# Patient Record
Sex: MALE | Race: WHITE | Employment: OTHER | URBAN - METROPOLITAN AREA
[De-identification: names, ages, dates, MRNs, and addresses within clinical notes are randomized per-mention and may not be internally consistent; named-entity substitution may affect disease eponyms.]

---

## 2017-04-13 ENCOUNTER — RECORDS - HEALTHEAST (OUTPATIENT)
Dept: LAB | Facility: CLINIC | Age: 67
End: 2017-04-13

## 2017-04-13 LAB
CHOLEST SERPL-MCNC: 165 MG/DL
FASTING STATUS PATIENT QL REPORTED: NO
HDLC SERPL-MCNC: 49 MG/DL
LDLC SERPL CALC-MCNC: 99 MG/DL
PSA SERPL-MCNC: 0.2 NG/ML (ref 0–4.5)
TRIGL SERPL-MCNC: 86 MG/DL

## 2018-06-19 ENCOUNTER — RECORDS - HEALTHEAST (OUTPATIENT)
Dept: LAB | Facility: CLINIC | Age: 68
End: 2018-06-19

## 2018-06-19 LAB
ALBUMIN SERPL-MCNC: 4.1 G/DL (ref 3.5–5)
ALP SERPL-CCNC: 105 U/L (ref 45–120)
ALT SERPL W P-5'-P-CCNC: 22 U/L (ref 0–45)
ANION GAP SERPL CALCULATED.3IONS-SCNC: 14 MMOL/L (ref 5–18)
AST SERPL W P-5'-P-CCNC: 17 U/L (ref 0–40)
BASOPHILS # BLD AUTO: 0.1 THOU/UL (ref 0–0.2)
BASOPHILS NFR BLD AUTO: 1 % (ref 0–2)
BILIRUB SERPL-MCNC: 1 MG/DL (ref 0–1)
BUN SERPL-MCNC: 14 MG/DL (ref 8–22)
CALCIUM SERPL-MCNC: 10.1 MG/DL (ref 8.5–10.5)
CHLORIDE BLD-SCNC: 104 MMOL/L (ref 98–107)
CHOLEST SERPL-MCNC: 198 MG/DL
CO2 SERPL-SCNC: 22 MMOL/L (ref 22–31)
CREAT SERPL-MCNC: 0.82 MG/DL (ref 0.7–1.3)
EOSINOPHIL # BLD AUTO: 0.1 THOU/UL (ref 0–0.4)
EOSINOPHIL NFR BLD AUTO: 1 % (ref 0–6)
ERYTHROCYTE [DISTWIDTH] IN BLOOD BY AUTOMATED COUNT: 13.2 % (ref 11–14.5)
FASTING STATUS PATIENT QL REPORTED: NORMAL
GFR SERPL CREATININE-BSD FRML MDRD: >60 ML/MIN/1.73M2
GLUCOSE BLD-MCNC: 111 MG/DL (ref 70–125)
HCT VFR BLD AUTO: 48.1 % (ref 40–54)
HDLC SERPL-MCNC: 68 MG/DL
HGB BLD-MCNC: 16.1 G/DL (ref 14–18)
LDLC SERPL CALC-MCNC: 112 MG/DL
LYMPHOCYTES # BLD AUTO: 1.8 THOU/UL (ref 0.8–4.4)
LYMPHOCYTES NFR BLD AUTO: 22 % (ref 20–40)
MCH RBC QN AUTO: 32.7 PG (ref 27–34)
MCHC RBC AUTO-ENTMCNC: 33.5 G/DL (ref 32–36)
MCV RBC AUTO: 98 FL (ref 80–100)
MONOCYTES # BLD AUTO: 0.6 THOU/UL (ref 0–0.9)
MONOCYTES NFR BLD AUTO: 8 % (ref 2–10)
NEUTROPHILS # BLD AUTO: 5.4 THOU/UL (ref 2–7.7)
NEUTROPHILS NFR BLD AUTO: 68 % (ref 50–70)
PLATELET # BLD AUTO: 316 THOU/UL (ref 140–440)
PMV BLD AUTO: 9.6 FL (ref 8.5–12.5)
POTASSIUM BLD-SCNC: 4.5 MMOL/L (ref 3.5–5)
PROT SERPL-MCNC: 7.6 G/DL (ref 6–8)
PSA SERPL-MCNC: 0.2 NG/ML (ref 0–4.5)
RBC # BLD AUTO: 4.92 MILL/UL (ref 4.4–6.2)
SODIUM SERPL-SCNC: 140 MMOL/L (ref 136–145)
TRIGL SERPL-MCNC: 91 MG/DL
WBC: 8 THOU/UL (ref 4–11)

## 2018-09-05 RX ORDER — TROPICAMIDE 10 MG/ML
1 SOLUTION/ DROPS OPHTHALMIC
Status: CANCELLED | OUTPATIENT
Start: 2018-09-05

## 2018-09-14 ENCOUNTER — RECORDS - HEALTHEAST (OUTPATIENT)
Dept: LAB | Facility: CLINIC | Age: 68
End: 2018-09-14

## 2018-09-14 LAB
ANION GAP SERPL CALCULATED.3IONS-SCNC: 10 MMOL/L (ref 5–18)
BUN SERPL-MCNC: 15 MG/DL (ref 8–22)
CALCIUM SERPL-MCNC: 10.1 MG/DL (ref 8.5–10.5)
CHLORIDE BLD-SCNC: 108 MMOL/L (ref 98–107)
CO2 SERPL-SCNC: 23 MMOL/L (ref 22–31)
CREAT SERPL-MCNC: 0.73 MG/DL (ref 0.7–1.3)
GFR SERPL CREATININE-BSD FRML MDRD: >60 ML/MIN/1.73M2
GLUCOSE BLD-MCNC: 73 MG/DL (ref 70–125)
POTASSIUM BLD-SCNC: 4.1 MMOL/L (ref 3.5–5)
SODIUM SERPL-SCNC: 141 MMOL/L (ref 136–145)

## 2018-09-27 RX ORDER — MULTIPLE VITAMINS W/ MINERALS TAB 9MG-400MCG
1 TAB ORAL DAILY
COMMUNITY

## 2018-09-27 RX ORDER — ALBUTEROL SULFATE 90 UG/1
2 AEROSOL, METERED RESPIRATORY (INHALATION) EVERY 4 HOURS PRN
COMMUNITY

## 2018-09-27 RX ORDER — BUDESONIDE AND FORMOTEROL FUMARATE DIHYDRATE 160; 4.5 UG/1; UG/1
2 AEROSOL RESPIRATORY (INHALATION) 2 TIMES DAILY
COMMUNITY

## 2018-09-27 RX ORDER — ASPIRIN 81 MG/1
81 TABLET, CHEWABLE ORAL DAILY
COMMUNITY

## 2018-10-01 ENCOUNTER — ANESTHESIA EVENT (OUTPATIENT)
Dept: SURGERY | Facility: CLINIC | Age: 68
End: 2018-10-01
Payer: MEDICARE

## 2018-10-01 ENCOUNTER — HOSPITAL ENCOUNTER (OUTPATIENT)
Facility: CLINIC | Age: 68
Discharge: HOME | End: 2018-10-01
Attending: OPHTHALMOLOGY | Admitting: OPHTHALMOLOGY
Payer: MEDICARE

## 2018-10-01 ENCOUNTER — ANESTHESIA (OUTPATIENT)
Dept: SURGERY | Facility: CLINIC | Age: 68
End: 2018-10-01
Payer: MEDICARE

## 2018-10-01 VITALS
RESPIRATION RATE: 16 BRPM | DIASTOLIC BLOOD PRESSURE: 75 MMHG | WEIGHT: 230.4 LBS | OXYGEN SATURATION: 97 % | HEIGHT: 69 IN | BODY MASS INDEX: 34.13 KG/M2 | SYSTOLIC BLOOD PRESSURE: 131 MMHG | TEMPERATURE: 97.7 F

## 2018-10-01 PROCEDURE — 40000170 ZZH STATISTIC PRE-PROCEDURE ASSESSMENT II: Performed by: OPHTHALMOLOGY

## 2018-10-01 PROCEDURE — 25000125 ZZHC RX 250: Performed by: ANESTHESIOLOGY

## 2018-10-01 PROCEDURE — 25000125 ZZHC RX 250: Performed by: NURSE ANESTHETIST, CERTIFIED REGISTERED

## 2018-10-01 PROCEDURE — 25000128 H RX IP 250 OP 636: Performed by: OPHTHALMOLOGY

## 2018-10-01 PROCEDURE — V2785 CORNEAL TISSUE PROCESSING: HCPCS | Performed by: OPHTHALMOLOGY

## 2018-10-01 PROCEDURE — 25000128 H RX IP 250 OP 636: Performed by: ANESTHESIOLOGY

## 2018-10-01 PROCEDURE — 36000105 ZZH EYE SURGERY LEVEL 4 EA 15 ADDTL MIN: Performed by: OPHTHALMOLOGY

## 2018-10-01 PROCEDURE — 25000128 H RX IP 250 OP 636: Performed by: NURSE ANESTHETIST, CERTIFIED REGISTERED

## 2018-10-01 PROCEDURE — 36000104 ZZH EYE SURGERY LEVEL 4 1ST 30 MIN: Performed by: OPHTHALMOLOGY

## 2018-10-01 PROCEDURE — V2632 POST CHMBR INTRAOCULAR LENS: HCPCS | Performed by: OPHTHALMOLOGY

## 2018-10-01 PROCEDURE — 71000028 ZZH EYE RECOVERY PHASE 2 EACH 15 MINS: Performed by: OPHTHALMOLOGY

## 2018-10-01 PROCEDURE — 37000008 ZZH ANESTHESIA TECHNICAL FEE, 1ST 30 MIN: Performed by: OPHTHALMOLOGY

## 2018-10-01 PROCEDURE — 37000009 ZZH ANESTHESIA TECHNICAL FEE, EACH ADDTL 15 MIN: Performed by: OPHTHALMOLOGY

## 2018-10-01 PROCEDURE — A9270 NON-COVERED ITEM OR SERVICE: HCPCS | Performed by: OPHTHALMOLOGY

## 2018-10-01 PROCEDURE — 27210794 ZZH OR GENERAL SUPPLY STERILE: Performed by: OPHTHALMOLOGY

## 2018-10-01 PROCEDURE — 25000125 ZZHC RX 250: Performed by: OPHTHALMOLOGY

## 2018-10-01 DEVICE — EYE IMP IOL AMO PCL TECNIS ZCB00 19.5: Type: IMPLANTABLE DEVICE | Site: EYE | Status: FUNCTIONAL

## 2018-10-01 DEVICE — EYE CORNEA PROCESS FEE FOR DESCEMENTS MN LIONS EYE BANK: Type: IMPLANTABLE DEVICE | Site: EYE | Status: FUNCTIONAL

## 2018-10-01 RX ORDER — PHENYLEPHRINE HYDROCHLORIDE 25 MG/ML
1 SOLUTION/ DROPS OPHTHALMIC
Status: COMPLETED | OUTPATIENT
Start: 2018-10-01 | End: 2018-10-01

## 2018-10-01 RX ORDER — PROPARACAINE HYDROCHLORIDE 5 MG/ML
1 SOLUTION/ DROPS OPHTHALMIC ONCE
Status: DISCONTINUED | OUTPATIENT
Start: 2018-10-01 | End: 2018-10-01 | Stop reason: HOSPADM

## 2018-10-01 RX ORDER — LIDOCAINE HYDROCHLORIDE 10 MG/ML
INJECTION, SOLUTION INFILTRATION; PERINEURAL PRN
Status: DISCONTINUED | OUTPATIENT
Start: 2018-10-01 | End: 2018-10-01

## 2018-10-01 RX ORDER — SODIUM CHLORIDE, SODIUM LACTATE, POTASSIUM CHLORIDE, CALCIUM CHLORIDE 600; 310; 30; 20 MG/100ML; MG/100ML; MG/100ML; MG/100ML
INJECTION, SOLUTION INTRAVENOUS CONTINUOUS
Status: DISCONTINUED | OUTPATIENT
Start: 2018-10-01 | End: 2018-10-01 | Stop reason: HOSPADM

## 2018-10-01 RX ORDER — TRIAMCINOLONE ACETONIDE 40 MG/ML
INJECTION, SUSPENSION INTRA-ARTICULAR; INTRAMUSCULAR PRN
Status: DISCONTINUED | OUTPATIENT
Start: 2018-10-01 | End: 2018-10-01 | Stop reason: HOSPADM

## 2018-10-01 RX ORDER — TIMOLOL MALEATE 5 MG/ML
SOLUTION/ DROPS OPHTHALMIC PRN
Status: DISCONTINUED | OUTPATIENT
Start: 2018-10-01 | End: 2018-10-01 | Stop reason: HOSPADM

## 2018-10-01 RX ORDER — MOXIFLOXACIN 5 MG/ML
1 SOLUTION/ DROPS OPHTHALMIC
Status: COMPLETED | OUTPATIENT
Start: 2018-10-01 | End: 2018-10-01

## 2018-10-01 RX ORDER — PROPOFOL 10 MG/ML
INJECTION, EMULSION INTRAVENOUS PRN
Status: DISCONTINUED | OUTPATIENT
Start: 2018-10-01 | End: 2018-10-01

## 2018-10-01 RX ORDER — DICLOFENAC SODIUM 1 MG/ML
1 SOLUTION/ DROPS OPHTHALMIC
Status: COMPLETED | OUTPATIENT
Start: 2018-10-01 | End: 2018-10-01

## 2018-10-01 RX ORDER — PROPARACAINE HYDROCHLORIDE 5 MG/ML
1 SOLUTION/ DROPS OPHTHALMIC ONCE
Status: COMPLETED | OUTPATIENT
Start: 2018-10-01 | End: 2018-10-01

## 2018-10-01 RX ORDER — ONDANSETRON 2 MG/ML
INJECTION INTRAMUSCULAR; INTRAVENOUS PRN
Status: DISCONTINUED | OUTPATIENT
Start: 2018-10-01 | End: 2018-10-01

## 2018-10-01 RX ORDER — FENTANYL CITRATE 50 UG/ML
INJECTION, SOLUTION INTRAMUSCULAR; INTRAVENOUS PRN
Status: DISCONTINUED | OUTPATIENT
Start: 2018-10-01 | End: 2018-10-01

## 2018-10-01 RX ORDER — BALANCED SALT SOLUTION 6.4; .75; .48; .3; 3.9; 1.7 MG/ML; MG/ML; MG/ML; MG/ML; MG/ML; MG/ML
SOLUTION OPHTHALMIC PRN
Status: DISCONTINUED | OUTPATIENT
Start: 2018-10-01 | End: 2018-10-01 | Stop reason: HOSPADM

## 2018-10-01 RX ORDER — SODIUM CHLORIDE, SODIUM LACTATE, POTASSIUM CHLORIDE, CALCIUM CHLORIDE 600; 310; 30; 20 MG/100ML; MG/100ML; MG/100ML; MG/100ML
INJECTION, SOLUTION INTRAVENOUS CONTINUOUS PRN
Status: DISCONTINUED | OUTPATIENT
Start: 2018-10-01 | End: 2018-10-01

## 2018-10-01 RX ADMIN — PROPOFOL 20 MG: 10 INJECTION, EMULSION INTRAVENOUS at 08:41

## 2018-10-01 RX ADMIN — ONDANSETRON 4 MG: 2 INJECTION INTRAMUSCULAR; INTRAVENOUS at 08:42

## 2018-10-01 RX ADMIN — FENTANYL CITRATE 50 MCG: 50 INJECTION, SOLUTION INTRAMUSCULAR; INTRAVENOUS at 08:34

## 2018-10-01 RX ADMIN — MOXIFLOXACIN 1 DROP: 5 SOLUTION/ DROPS OPHTHALMIC at 06:57

## 2018-10-01 RX ADMIN — FENTANYL CITRATE 25 MCG: 50 INJECTION, SOLUTION INTRAMUSCULAR; INTRAVENOUS at 08:58

## 2018-10-01 RX ADMIN — FENTANYL CITRATE 50 MCG: 50 INJECTION, SOLUTION INTRAMUSCULAR; INTRAVENOUS at 09:21

## 2018-10-01 RX ADMIN — PHENYLEPHRINE HYDROCHLORIDE 1 DROP: 2.5 SOLUTION/ DROPS OPHTHALMIC at 06:57

## 2018-10-01 RX ADMIN — DEXMEDETOMIDINE HYDROCHLORIDE 4 MCG: 100 INJECTION, SOLUTION INTRAVENOUS at 08:58

## 2018-10-01 RX ADMIN — MOXIFLOXACIN 1 DROP: 5 SOLUTION/ DROPS OPHTHALMIC at 07:06

## 2018-10-01 RX ADMIN — PROPOFOL 50 MG: 10 INJECTION, EMULSION INTRAVENOUS at 08:39

## 2018-10-01 RX ADMIN — DEXMEDETOMIDINE HYDROCHLORIDE 4 MCG: 100 INJECTION, SOLUTION INTRAVENOUS at 08:32

## 2018-10-01 RX ADMIN — DICLOFENAC SODIUM 1 DROP: 1 SOLUTION OPHTHALMIC at 06:57

## 2018-10-01 RX ADMIN — FENTANYL CITRATE 25 MCG: 50 INJECTION, SOLUTION INTRAMUSCULAR; INTRAVENOUS at 08:39

## 2018-10-01 RX ADMIN — PHENYLEPHRINE HYDROCHLORIDE 1 DROP: 2.5 SOLUTION/ DROPS OPHTHALMIC at 07:03

## 2018-10-01 RX ADMIN — PROPOFOL 10 MG: 10 INJECTION, EMULSION INTRAVENOUS at 09:10

## 2018-10-01 RX ADMIN — LIDOCAINE HYDROCHLORIDE 40 MG: 10 INJECTION, SOLUTION INFILTRATION; PERINEURAL at 08:39

## 2018-10-01 RX ADMIN — FENTANYL CITRATE 50 MCG: 50 INJECTION, SOLUTION INTRAMUSCULAR; INTRAVENOUS at 09:27

## 2018-10-01 RX ADMIN — LIDOCAINE HYDROCHLORIDE 1 ML: 10 INJECTION, SOLUTION EPIDURAL; INFILTRATION; INTRACAUDAL; PERINEURAL at 07:06

## 2018-10-01 RX ADMIN — SODIUM CHLORIDE, POTASSIUM CHLORIDE, SODIUM LACTATE AND CALCIUM CHLORIDE: 600; 310; 30; 20 INJECTION, SOLUTION INTRAVENOUS at 08:03

## 2018-10-01 RX ADMIN — PROPARACAINE HYDROCHLORIDE 1 DROP: 5 SOLUTION/ DROPS OPHTHALMIC at 06:57

## 2018-10-01 RX ADMIN — DEXMEDETOMIDINE HYDROCHLORIDE 4 MCG: 100 INJECTION, SOLUTION INTRAVENOUS at 09:10

## 2018-10-01 RX ADMIN — MOXIFLOXACIN 1 DROP: 5 SOLUTION/ DROPS OPHTHALMIC at 07:03

## 2018-10-01 RX ADMIN — SODIUM CHLORIDE, POTASSIUM CHLORIDE, SODIUM LACTATE AND CALCIUM CHLORIDE: 600; 310; 30; 20 INJECTION, SOLUTION INTRAVENOUS at 07:07

## 2018-10-01 RX ADMIN — PROPOFOL 10 MG: 10 INJECTION, EMULSION INTRAVENOUS at 09:32

## 2018-10-01 RX ADMIN — DEXMEDETOMIDINE HYDROCHLORIDE 4 MCG: 100 INJECTION, SOLUTION INTRAVENOUS at 09:07

## 2018-10-01 RX ADMIN — DEXMEDETOMIDINE HYDROCHLORIDE 4 MCG: 100 INJECTION, SOLUTION INTRAVENOUS at 09:24

## 2018-10-01 RX ADMIN — MIDAZOLAM 2 MG: 1 INJECTION INTRAMUSCULAR; INTRAVENOUS at 08:28

## 2018-10-01 RX ADMIN — DICLOFENAC SODIUM 1 DROP: 1 SOLUTION OPHTHALMIC at 07:03

## 2018-10-01 RX ADMIN — DICLOFENAC SODIUM 1 DROP: 1 SOLUTION OPHTHALMIC at 07:06

## 2018-10-01 RX ADMIN — PHENYLEPHRINE HYDROCHLORIDE 1 DROP: 2.5 SOLUTION/ DROPS OPHTHALMIC at 07:06

## 2018-10-01 ASSESSMENT — COPD QUESTIONNAIRES: COPD: 1

## 2018-10-01 NOTE — DISCHARGE INSTRUCTIONS
Dr. JESSI Concepcion  Post Operative DSEK Instructions      AFTER DSEK SURGERY INSTRUCTIONS    I. General Reminders  a. Continue any medication from your general medical doctor unless instructed otherwise.  b. Call immediately if you have any problems or questions.  II. Symptoms that often occur after surgery  a. Scratchiness  b. Foreign body sensation  III.  ALERT  symptoms - Call immediately should these occur ([phone])  a. Deep aching pain with headache and/or nausea  IV. Post-operative care  a. Lie flat in bed until your follow up visit the day after surgery.  Get up every hour while awake to go to the bathroom and have a snack for 5-10 minutes.  b. You will be using eye drops following surgery.  The nurse will explain them and there will also be instructions on the bottles.  V. The first couple weeks following surgery.  a. You will wear a shield at bedtime until your doctor tells you to discontinue (usually around 2 weeks).  b. Minimize eye rubbing or heavy lifting for the first week following surgery.  c. No swimming, hot tubs, or whirlpools for 2 weeks.  Showers are ok after your follow up visit the day after surgery.  d. You will typically need to be seen for follow-up and post-operative care the day after surgery, at 2 weeks, 1-2 months, and 6 months following surgery.  You will likely need visits every 6 months to 1 year.  e. It is advised that you do not receive preventive flu or pneumonia shots after corneal transplant procedures as this could lead to increased risk of corneal rejection.  VI. Additional Postoperative Instructions  a. Although you will be awake and alert in the recovery room, small amounts of anesthetic will remain in your body for at least 24 hours and you may feel tired and sleepy for the remainder of the day.  You may get up to use the restroom and for meals.  It is advisable to have someone with you at home for the remainder of the day.  b. Drink plenty of fluids.  Alcoholic beverages  should be avoided for 24 hours after your anesthesia or intravenous sedation.  c. Prolonged nausea, vomiting, loss of vision, discharge (other than tearing), or severe pain should be reported to your doctor.  Some discomfort in the operative eye the day of surgery is normal.          [facility] Discharge  Instructions  Anesthesia (Eye Care Center)   Adult Discharge Instructions    For 24 hours after surgery    1. Get plenty of rest.  Make arrangements to have a responsible adult stay with you for at least 24 hours after you leave the hospital.  2. Do not drive or use heavy equipment for 24 hours.    3. Do not drink alcohol for 24 hours.  4. Do not sign legal documents or make important decisions for 24 hours.  5. Avoid strenuous or risky activities. You may feel lightheaded.  If so, sit for a few minutes before standing.  Have someone help you get up.   6. Conscious sedation patients may resume a regular diet..  7. Any questions of medical nature, call your physician.

## 2018-10-01 NOTE — OR NURSING
GREEN BAND TO LEFT WRIST FOR AIR INJECTED INTO LEFT EYE. IS TO REMAIN ON BACK FOR 1 HOUR AFTER PROCEDURE. MD DID DISCUSS WITH PATIENT AND WILL WRITE FURTHER INSTRUCTIONS PRIOR TO DISCHARGE.

## 2018-10-01 NOTE — IP AVS SNAPSHOT
MRN:[id number]                      After Visit Summary   10/1/2018    Nirmal Ferris    MRN: [id number]           Thank you!     Thank you for choosing [facility] for your care. Our goal is always to provide you with excellent care. Hearing back from our patients is one way we can continue to improve our services. Please take a few minutes to complete the written survey that you may receive in the mail after you visit with us. Thank you!        Patient Information     Date Of Birth          1950        About your hospital stay     You were admitted on:  October 1, 2018 You last received care in the:  [facility]    You were discharged on:  October 1, 2018       Who to Call     For medical emergencies, please call 911.  For non-urgent questions about your medical care, please call your primary care provider or clinic, None  For questions related to your surgery, please call your surgery clinic        Attending Provider     Provider Tl De Oliveira MD Ophthalmology       Primary Care Provider    None Specified      Further instructions from your care team       Dr. JESSI Concepcion  Post Operative DSEK Instructions      AFTER DSEK SURGERY INSTRUCTIONS    I. General Reminders  a. Continue any medication from your general medical doctor unless instructed otherwise.  b. Call immediately if you have any problems or questions.  II. Symptoms that often occur after surgery  a. Scratchiness  b. Foreign body sensation  III.  ALERT  symptoms - Call immediately should these occur ([phone])  a. Deep aching pain with headache and/or nausea  IV. Post-operative care  a. Lie flat in bed until your follow up visit the day after surgery.  Get up every hour while awake to go to the bathroom and have a snack for 5-10 minutes.  b. You will be using eye drops following surgery.  The nurse will explain them and there will also be instructions on the bottles.  V. The first couple weeks  following surgery.  a. You will wear a shield at bedtime until your doctor tells you to discontinue (usually around 2 weeks).  b. Minimize eye rubbing or heavy lifting for the first week following surgery.  c. No swimming, hot tubs, or whirlpools for 2 weeks.  Showers are ok after your follow up visit the day after surgery.  d. You will typically need to be seen for follow-up and post-operative care the day after surgery, at 2 weeks, 1-2 months, and 6 months following surgery.  You will likely need visits every 6 months to 1 year.  e. It is advised that you do not receive preventive flu or pneumonia shots after corneal transplant procedures as this could lead to increased risk of corneal rejection.  VI. Additional Postoperative Instructions  a. Although you will be awake and alert in the recovery room, small amounts of anesthetic will remain in your body for at least 24 hours and you may feel tired and sleepy for the remainder of the day.  You may get up to use the restroom and for meals.  It is advisable to have someone with you at home for the remainder of the day.  b. Drink plenty of fluids.  Alcoholic beverages should be avoided for 24 hours after your anesthesia or intravenous sedation.  c. Prolonged nausea, vomiting, loss of vision, discharge (other than tearing), or severe pain should be reported to your doctor.  Some discomfort in the operative eye the day of surgery is normal.          [facility] Discharge  Instructions  Anesthesia (Eye Care Center)   Adult Discharge Instructions    For 24 hours after surgery    1. Get plenty of rest.  Make arrangements to have a responsible adult stay with you for at least 24 hours after you leave the hospital.  2. Do not drive or use heavy equipment for 24 hours.    3. Do not drink alcohol for 24 hours.  4. Do not sign legal documents or make important decisions for 24 hours.  5. Avoid strenuous or risky activities. You may feel  "lightheaded.  If so, sit for a few minutes before standing.  Have someone help you get up.   6. Conscious sedation patients may resume a regular diet..  7. Any questions of medical nature, call your physician.    Pending Results     No orders found from 2018 to 10/2/2018.            Admission Information     Date & Time Provider Department Dept. Phone    10/1/2018 Tl Concepcion MD [facility] [phone]      Your Vitals Were     Blood Pressure Temperature Respirations Height Weight Pulse Oximetry    145/86 97.7  F (36.5  C) (Temporal) 16 1.74 m (5' 8.5\") 104.5 kg (230 lb 6.4 oz) 94%    BMI (Body Mass Index)                   34.52 kg/m2           MyChart Information     [email or web address] lets you send messages to your doctor, view your test results, renew your prescriptions, schedule appointments and more. To sign up, go to [email or web address] . Click on \"Log in\" on the left side of the screen, which will take you to the Welcome page. Then click on \"Sign up Now\" on the right side of the page.     You will be asked to enter the access code listed below, as well as some personal information. Please follow the directions to create your username and password.     Your access code is: [email or web address]  Expires: 2018  9:51 AM     Your access code will  in 90 days. If you need help or a new code, please call your [facility] clinic or [phone].        Care EveryWhere ID     This is your Care EveryWhere ID. This could be used by other organizations to access your [facility] medical records  [id number]        Equal Access to Services     АННА LICEA AH: Hadii jackie Leonardo, aneta umana, frank trujillo. So [facility] [phone].    ATENCIÓN: Si habla español, tiene a hannah disposición servicios gratuitos de asistencia lingüística. Llame al [phone].    We comply with applicable federal civil rights laws and [place] laws. We do " not discriminate on the basis of race, color, national origin, age, disability, sex, sexual orientation, or gender identity.               Review of your medicines      CONTINUE these medicines which have NOT CHANGED        Dose / Directions    albuterol 108 (90 Base) MCG/ACT inhaler   Commonly known as:  PROAIR HFA/PROVENTIL HFA/VENTOLIN HFA        Dose:  2 puff   Inhale 2 puffs into the lungs every 4 hours as needed for shortness of breath / dyspnea or wheezing   Refills:  0       aspirin 81 MG chewable tablet        Dose:  81 mg   Take 81 mg by mouth daily   Refills:  0       budesonide-formoterol 160-4.5 MCG/ACT Inhaler   Commonly known as:  SYMBICORT        Dose:  2 puff   Inhale 2 puffs into the lungs 2 times daily   Refills:  0       BUPROPION HCL ER (SR) PO        Dose:  200 mg   Take 200 mg by mouth daily   Refills:  0       FINASTERIDE PO        Dose:  5 mg   Take 5 mg by mouth daily   Refills:  0       LOSARTAN POTASSIUM PO        Dose:  25 mg   Take 25 mg by mouth daily   Refills:  0       LOVASTATIN PO        Dose:  40 mg   Take 40 mg by mouth At Bedtime   Refills:  0       Multi-vitamin Tabs tablet        Dose:  1 tablet   Take 1 tablet by mouth daily   Refills:  0       NAPROXEN PO        Dose:  500 mg   Take 500 mg by mouth 2 times daily (with meals)   Refills:  0       RISPERDAL PO        Dose:  4 mg   Take 4 mg by mouth daily   Refills:  0       TAMSULOSIN HCL PO        Dose:  0.4 mg   Take 0.4 mg by mouth daily   Refills:  0       TRAZODONE HCL PO        Dose:  100 mg   Take 100 mg by mouth nightly as needed for sleep   Refills:  0                Protect others around you: Learn how to safely use, store and throw away your medicines at www.disposemymeds.org.             Medication List: This is a list of all your medications and when to take them. Check marks below indicate your daily home schedule. Keep this list as a reference.      Medications           Morning Afternoon Evening Bedtime As  Needed    albuterol 108 (90 Base) MCG/ACT inhaler   Commonly known as:  PROAIR HFA/PROVENTIL HFA/VENTOLIN HFA   Inhale 2 puffs into the lungs every 4 hours as needed for shortness of breath / dyspnea or wheezing                                aspirin 81 MG chewable tablet   Take 81 mg by mouth daily                                budesonide-formoterol 160-4.5 MCG/ACT Inhaler   Commonly known as:  SYMBICORT   Inhale 2 puffs into the lungs 2 times daily                                BUPROPION HCL ER (SR) PO   Take 200 mg by mouth daily                                FINASTERIDE PO   Take 5 mg by mouth daily                                LOSARTAN POTASSIUM PO   Take 25 mg by mouth daily                                LOVASTATIN PO   Take 40 mg by mouth At Bedtime                                Multi-vitamin Tabs tablet   Take 1 tablet by mouth daily                                NAPROXEN PO   Take 500 mg by mouth 2 times daily (with meals)                                RISPERDAL PO   Take 4 mg by mouth daily                                TAMSULOSIN HCL PO   Take 0.4 mg by mouth daily                                TRAZODONE HCL PO   Take 100 mg by mouth nightly as needed for sleep

## 2018-10-01 NOTE — ANESTHESIA PREPROCEDURE EVALUATION
Anesthesia Evaluation     . Pt has had prior anesthetic.            ROS/MED HX    ENT/Pulmonary:     (+)sleep apnea, Mild Persistent asthma Treatment: Inhaler daily,  COPD, doesn't use CPAP , . .    Neurologic:  - neg neurologic ROS     Cardiovascular:     (+) Dyslipidemia, hypertension----. : . . . :. .       METS/Exercise Tolerance:     Hematologic:  - neg hematologic  ROS       Musculoskeletal:         GI/Hepatic:        (-) GERD   Renal/Genitourinary:  - ROS Renal section negative       Endo:  - neg endo ROS   (+) Obesity, .      Psychiatric:         Infectious Disease:         Malignancy:         Other:                     Physical Exam  Normal systems: cardiovascular and pulmonary    Airway   Mallampati: II  TM distance: >3 FB  Neck ROM: full    Dental   (+) partials    Cardiovascular       Pulmonary                     Anesthesia Plan      History & Physical Review  History and physical reviewed and following examination; no interval change.    ASA Status:  2 .    NPO Status:  > 8 hours    Plan for MAC Reason for MAC:  Procedure to face, neck, head or breast  PONV prophylaxis:  Ondansetron (or other 5HT-3)       Postoperative Care  Postoperative pain management:  Oral pain medications.      Consents  Anesthetic plan, risks, benefits and alternatives discussed with:  Patient..                          .

## 2018-10-01 NOTE — ANESTHESIA CARE TRANSFER NOTE
Patient: Nirmal Ferris    Procedure(s):  COMPLEX LEFT PHACOEMULSIFICATION WITH STANDARD INTRAOCULAR LENS IMPLANT; LEFT DESCEMET ENDOTHELIAL KERATOPLASTY, SURGICAL IRIDOTOMY - Wound Class: I-Clean   - Wound Class: I-Clean    Diagnosis: cataract left eye, fuchs dystrophy left eye  Diagnosis Additional Information: No value filed.    Anesthesia Type:   MAC     Note:  Airway :Room Air  Patient transferred to:Phase II  Handoff Report: Identifed the Patient, Identified the Reponsible Provider, Reviewed the pertinent medical history, Discussed the surgical course, Reviewed Intra-OP anesthesia mangement and issues during anesthesia, Set expectations for post-procedure period and Allowed opportunity for questions and acknowledgement of understanding      Vitals: (Last set prior to Anesthesia Care Transfer)    CRNA VITALS  10/1/2018 0909 - 10/1/2018 0939      10/1/2018             Pulse: 93    SpO2: 95 %    Resp Rate (set): 10                Electronically Signed By: BHAKTI Montero CRNA  October 1, 2018  9:39 AM

## 2018-10-01 NOTE — ANESTHESIA POSTPROCEDURE EVALUATION
Patient: Nirmal Ferris    Procedure(s):  COMPLEX LEFT PHACOEMULSIFICATION WITH STANDARD INTRAOCULAR LENS IMPLANT; LEFT DESCEMET ENDOTHELIAL KERATOPLASTY, SURGICAL IRIDOTOMY - Wound Class: I-Clean   - Wound Class: I-Clean    Diagnosis:cataract left eye, fuchs dystrophy left eye  Diagnosis Additional Information: No value filed.    Anesthesia Type:  MAC    Note:  Anesthesia Post Evaluation    Patient location during evaluation: PACU  Patient participation: Able to fully participate in evaluation  Level of consciousness: awake and alert  Pain management: adequate  Airway patency: patent  Cardiovascular status: acceptable  Respiratory status: acceptable and unassisted  Hydration status: acceptable  PONV: none             Last vitals:  Vitals:    10/01/18 1020 10/01/18 1035 10/01/18 1045   BP: 155/87 133/84 131/75   Resp: 16 16 16   Temp:      SpO2: 95%  97%         Electronically Signed By: Nila Alexander MD  October 1, 2018  12:23 PM

## 2018-10-01 NOTE — IP AVS SNAPSHOT
[facility]    [address]    [place] [address]    Phone:  [phone]    Fax:  [phone]                                       After Visit Summary   10/1/2018    Nirmal Ferris    MRN: [id number]           After Visit Summary Signature Page     I have received my discharge instructions, and my questions have been answered. I have discussed any challenges I see with this plan with the nurse or doctor.    ..........................................................................................................................................  Patient/Patient Representative Signature      ..........................................................................................................................................  Patient Representative Print Name and Relationship to Patient    ..................................................               ................................................  Date                                   Time    ..........................................................................................................................................  Reviewed by Signature/Title    ...................................................              ..............................................  Date                                               Time          22EPIC Rev 08/18

## 2018-10-01 NOTE — BRIEF OP NOTE
[facility] Brief Operative Note    Pre-operative diagnosis: cataract left eye, fuchs dystrophy left eye   Post-operative diagnosis cataract, fuchs     Procedure: Procedure(s):  COMPLEX LEFT PHACOEMULSIFICATION WITH STANDARD INTRAOCULAR LENS IMPLANT; LEFT DESCEMET ENDOTHELIAL KERATOPLASTY, SURGICAL IRIDOTOMY - Wound Class: I-Clean   - Wound Class: I-Clean   Surgeon(s): Surgeon(s) and Role:     * Tl Concepcion MD - Primary   Estimated blood loss: * No values recorded between 10/1/2018  8:45 AM and 10/1/2018  9:41 AM *    Specimens: * No specimens in log *   Findings: 8.0mm DSEK graft using endoserter

## 2019-01-07 ENCOUNTER — ANESTHESIA EVENT (OUTPATIENT)
Dept: SURGERY | Facility: CLINIC | Age: 69
End: 2019-01-07
Payer: COMMERCIAL

## 2019-01-08 ENCOUNTER — HOSPITAL ENCOUNTER (OUTPATIENT)
Facility: CLINIC | Age: 69
Discharge: HOME | End: 2019-01-08
Attending: OPHTHALMOLOGY | Admitting: OPHTHALMOLOGY
Payer: COMMERCIAL

## 2019-01-08 ENCOUNTER — ANESTHESIA (OUTPATIENT)
Dept: SURGERY | Facility: CLINIC | Age: 69
End: 2019-01-08
Payer: COMMERCIAL

## 2019-01-08 VITALS
BODY MASS INDEX: 34.36 KG/M2 | TEMPERATURE: 96.6 F | WEIGHT: 232 LBS | DIASTOLIC BLOOD PRESSURE: 74 MMHG | OXYGEN SATURATION: 96 % | HEIGHT: 69 IN | RESPIRATION RATE: 16 BRPM | SYSTOLIC BLOOD PRESSURE: 144 MMHG

## 2019-01-08 PROCEDURE — 71000028 ZZH EYE RECOVERY PHASE 2 EACH 15 MINS: Performed by: OPHTHALMOLOGY

## 2019-01-08 PROCEDURE — 37000008 ZZH ANESTHESIA TECHNICAL FEE, 1ST 30 MIN: Performed by: OPHTHALMOLOGY

## 2019-01-08 PROCEDURE — 37000009 ZZH ANESTHESIA TECHNICAL FEE, EACH ADDTL 15 MIN: Performed by: OPHTHALMOLOGY

## 2019-01-08 PROCEDURE — 36000105 ZZH EYE SURGERY LEVEL 4 EA 15 ADDTL MIN: Performed by: OPHTHALMOLOGY

## 2019-01-08 PROCEDURE — 27210794 ZZH OR GENERAL SUPPLY STERILE: Performed by: OPHTHALMOLOGY

## 2019-01-08 PROCEDURE — 25000125 ZZHC RX 250: Performed by: OPHTHALMOLOGY

## 2019-01-08 PROCEDURE — 25000125 ZZHC RX 250: Performed by: NURSE ANESTHETIST, CERTIFIED REGISTERED

## 2019-01-08 PROCEDURE — 25000128 H RX IP 250 OP 636: Performed by: NURSE ANESTHETIST, CERTIFIED REGISTERED

## 2019-01-08 PROCEDURE — 36000104 ZZH EYE SURGERY LEVEL 4 1ST 30 MIN: Performed by: OPHTHALMOLOGY

## 2019-01-08 PROCEDURE — V2785 CORNEAL TISSUE PROCESSING: HCPCS | Performed by: OPHTHALMOLOGY

## 2019-01-08 PROCEDURE — 25000128 H RX IP 250 OP 636: Performed by: OPHTHALMOLOGY

## 2019-01-08 PROCEDURE — 25000128 H RX IP 250 OP 636: Performed by: ANESTHESIOLOGY

## 2019-01-08 PROCEDURE — V2632 POST CHMBR INTRAOCULAR LENS: HCPCS | Performed by: OPHTHALMOLOGY

## 2019-01-08 PROCEDURE — 40000170 ZZH STATISTIC PRE-PROCEDURE ASSESSMENT II: Performed by: OPHTHALMOLOGY

## 2019-01-08 DEVICE — EYE CORNEA PROCESS FEE FOR DESCEMENTS MN LIONS EYE BANK: Type: IMPLANTABLE DEVICE | Site: EYE | Status: FUNCTIONAL

## 2019-01-08 DEVICE — EYE IMP IOL AMO PCL TECNIS ZCB00 18.5: Type: IMPLANTABLE DEVICE | Site: EYE | Status: FUNCTIONAL

## 2019-01-08 RX ORDER — TIMOLOL MALEATE 5 MG/ML
SOLUTION/ DROPS OPHTHALMIC PRN
Status: DISCONTINUED | OUTPATIENT
Start: 2019-01-08 | End: 2019-01-08 | Stop reason: HOSPADM

## 2019-01-08 RX ORDER — MOXIFLOXACIN 5 MG/ML
1 SOLUTION/ DROPS OPHTHALMIC
Status: COMPLETED | OUTPATIENT
Start: 2019-01-08 | End: 2019-01-08

## 2019-01-08 RX ORDER — PROPOFOL 10 MG/ML
INJECTION, EMULSION INTRAVENOUS PRN
Status: DISCONTINUED | OUTPATIENT
Start: 2019-01-08 | End: 2019-01-08

## 2019-01-08 RX ORDER — PHENYLEPHRINE HYDROCHLORIDE 25 MG/ML
1 SOLUTION/ DROPS OPHTHALMIC
Status: COMPLETED | OUTPATIENT
Start: 2019-01-08 | End: 2019-01-08

## 2019-01-08 RX ORDER — BALANCED SALT SOLUTION 6.4; .75; .48; .3; 3.9; 1.7 MG/ML; MG/ML; MG/ML; MG/ML; MG/ML; MG/ML
SOLUTION OPHTHALMIC PRN
Status: DISCONTINUED | OUTPATIENT
Start: 2019-01-08 | End: 2019-01-08 | Stop reason: HOSPADM

## 2019-01-08 RX ORDER — PROPARACAINE HYDROCHLORIDE 5 MG/ML
1 SOLUTION/ DROPS OPHTHALMIC ONCE
Status: DISCONTINUED | OUTPATIENT
Start: 2019-01-08 | End: 2019-01-08 | Stop reason: HOSPADM

## 2019-01-08 RX ORDER — FENTANYL CITRATE 50 UG/ML
INJECTION, SOLUTION INTRAMUSCULAR; INTRAVENOUS PRN
Status: DISCONTINUED | OUTPATIENT
Start: 2019-01-08 | End: 2019-01-08

## 2019-01-08 RX ORDER — ONDANSETRON 2 MG/ML
INJECTION INTRAMUSCULAR; INTRAVENOUS PRN
Status: DISCONTINUED | OUTPATIENT
Start: 2019-01-08 | End: 2019-01-08

## 2019-01-08 RX ORDER — SODIUM CHLORIDE, SODIUM LACTATE, POTASSIUM CHLORIDE, CALCIUM CHLORIDE 600; 310; 30; 20 MG/100ML; MG/100ML; MG/100ML; MG/100ML
INJECTION, SOLUTION INTRAVENOUS CONTINUOUS
Status: DISCONTINUED | OUTPATIENT
Start: 2019-01-08 | End: 2019-01-08 | Stop reason: HOSPADM

## 2019-01-08 RX ORDER — PROPARACAINE HYDROCHLORIDE 5 MG/ML
1 SOLUTION/ DROPS OPHTHALMIC ONCE
Status: COMPLETED | OUTPATIENT
Start: 2019-01-08 | End: 2019-01-08

## 2019-01-08 RX ORDER — TROPICAMIDE 10 MG/ML
1 SOLUTION/ DROPS OPHTHALMIC
Status: COMPLETED | OUTPATIENT
Start: 2019-01-08 | End: 2019-01-08

## 2019-01-08 RX ORDER — TRIAMCINOLONE ACETONIDE 40 MG/ML
INJECTION, SUSPENSION INTRA-ARTICULAR; INTRAMUSCULAR PRN
Status: DISCONTINUED | OUTPATIENT
Start: 2019-01-08 | End: 2019-01-08 | Stop reason: HOSPADM

## 2019-01-08 RX ORDER — DICLOFENAC SODIUM 1 MG/ML
1 SOLUTION/ DROPS OPHTHALMIC
Status: COMPLETED | OUTPATIENT
Start: 2019-01-08 | End: 2019-01-08

## 2019-01-08 RX ADMIN — Medication 8 MCG: at 08:45

## 2019-01-08 RX ADMIN — ONDANSETRON 4 MG: 2 INJECTION INTRAMUSCULAR; INTRAVENOUS at 08:52

## 2019-01-08 RX ADMIN — PHENYLEPHRINE HYDROCHLORIDE 1 DROP: 2.5 SOLUTION/ DROPS OPHTHALMIC at 07:31

## 2019-01-08 RX ADMIN — TROPICAMIDE 1 DROP: 10 SOLUTION/ DROPS OPHTHALMIC at 07:31

## 2019-01-08 RX ADMIN — MOXIFLOXACIN 1 DROP: 5 SOLUTION/ DROPS OPHTHALMIC at 07:31

## 2019-01-08 RX ADMIN — DICLOFENAC SODIUM 1 DROP: 1 SOLUTION OPHTHALMIC at 08:21

## 2019-01-08 RX ADMIN — PHENYLEPHRINE HYDROCHLORIDE 1 DROP: 2.5 SOLUTION/ DROPS OPHTHALMIC at 07:43

## 2019-01-08 RX ADMIN — SODIUM CHLORIDE, POTASSIUM CHLORIDE, SODIUM LACTATE AND CALCIUM CHLORIDE: 600; 310; 30; 20 INJECTION, SOLUTION INTRAVENOUS at 07:44

## 2019-01-08 RX ADMIN — DEXMEDETOMIDINE HYDROCHLORIDE 0.3 MCG/KG/HR: 100 INJECTION, SOLUTION INTRAVENOUS at 08:51

## 2019-01-08 RX ADMIN — DICLOFENAC SODIUM 1 DROP: 1 SOLUTION OPHTHALMIC at 08:11

## 2019-01-08 RX ADMIN — MOXIFLOXACIN 1 DROP: 5 SOLUTION/ DROPS OPHTHALMIC at 07:43

## 2019-01-08 RX ADMIN — PROPOFOL 30 MG: 10 INJECTION, EMULSION INTRAVENOUS at 08:55

## 2019-01-08 RX ADMIN — MIDAZOLAM 2 MG: 1 INJECTION INTRAMUSCULAR; INTRAVENOUS at 08:52

## 2019-01-08 RX ADMIN — FENTANYL CITRATE 50 MCG: 50 INJECTION, SOLUTION INTRAMUSCULAR; INTRAVENOUS at 08:55

## 2019-01-08 RX ADMIN — TROPICAMIDE 1 DROP: 10 SOLUTION/ DROPS OPHTHALMIC at 07:36

## 2019-01-08 RX ADMIN — DICLOFENAC SODIUM 1 DROP: 1 SOLUTION OPHTHALMIC at 08:04

## 2019-01-08 RX ADMIN — MOXIFLOXACIN 1 DROP: 5 SOLUTION/ DROPS OPHTHALMIC at 07:36

## 2019-01-08 RX ADMIN — Medication 12 MCG: at 08:38

## 2019-01-08 RX ADMIN — PROPARACAINE HYDROCHLORIDE 1 DROP: 5 SOLUTION/ DROPS OPHTHALMIC at 07:32

## 2019-01-08 RX ADMIN — PHENYLEPHRINE HYDROCHLORIDE 1 DROP: 2.5 SOLUTION/ DROPS OPHTHALMIC at 07:36

## 2019-01-08 RX ADMIN — TROPICAMIDE 1 DROP: 10 SOLUTION/ DROPS OPHTHALMIC at 07:43

## 2019-01-08 ASSESSMENT — COPD QUESTIONNAIRES: COPD: 1

## 2019-01-08 ASSESSMENT — MIFFLIN-ST. JEOR: SCORE: 1812.73

## 2019-01-08 ASSESSMENT — LIFESTYLE VARIABLES: TOBACCO_USE: 1

## 2019-01-08 NOTE — BRIEF OP NOTE
[facility]    Brief Operative Note    Pre-operative diagnosis: ENDOTHELIAL DYSTROPHY AND CATARACT RIGHT EYE  Post-operative diagnosis cataract  Procedure: Procedure(s):  COMPLEX RIGHT EYE PHACOEMULSIFICATION WITH STANDARD INTRAOCULAR LENS IMPLANT, DESCEMETS STRIPPING ENDOTHELIAL KERATOPLASTY,  INTRAOP PERIPHERIAL IRRIDOTOMY  Surgeon: Surgeon(s) and Role:     * Tl Concepcion MD - Primary  Anesthesia: Combined MAC with Retrobulbar   Estimated blood loss: None  Drains: None  Specimens: * No specimens in log *  Findings:   None.  Complications: None.  Implants: ZCB + 18.5

## 2019-01-08 NOTE — ANESTHESIA CARE TRANSFER NOTE
Patient: Nirmal Ferris    Procedure(s):  COMPLEX RIGHT EYE PHACOEMULSIFICATION WITH STANDARD INTRAOCULAR LENS IMPLANT, DESCEMETS STRIPPING ENDOTHELIAL KERATOPLASTY,  INTRAOP PERIPHERIAL IRRIDOTOMY    Diagnosis: ENDOTHELIAL DYSTROPHY AND CATARACT RIGHT EYE  Diagnosis Additional Information: No value filed.    Anesthesia Type:   MAC     Note:  Airway :Room Air  Patient transferred to:PACU  Handoff Report: Identifed the Patient, Identified the Reponsible Provider, Reviewed the pertinent medical history, Discussed the surgical course, Reviewed Intra-OP anesthesia mangement and issues during anesthesia, Set expectations for post-procedure period and Allowed opportunity for questions and acknowledgement of understanding      Vitals: (Last set prior to Anesthesia Care Transfer)    CRNA VITALS  1/8/2019 0914 - 1/8/2019 0947      1/8/2019             Pulse:  84    SpO2:  99 %    Resp Rate (set):  10                Electronically Signed By: BHAKTI Gallegos CRNA  January 8, 2019  9:47 AM

## 2019-01-08 NOTE — DISCHARGE INSTRUCTIONS
[facility] Discharge  Instructions  Anesthesia (Eye Care Center)   Adult Discharge Instructions    For 24 hours after surgery    1. Get plenty of rest.  Make arrangements to have a responsible adult stay with you for at least 24 hours after you leave the hospital.  2. Do not drive or use heavy equipment for 24 hours.    3. Do not drink alcohol for 24 hours.  4. Do not sign legal documents or make important decisions for 24 hours.  5. Avoid strenuous or risky activities. You may feel lightheaded.  If so, sit for a few minutes before standing.  Have someone help you get up.   6. Conscious sedation patients may resume a regular diet..  7. Any questions of medical nature, call your physician.    [facility] [place] [facility]  Post Operative Care  Following Cataract Surgery     Dr. Concepcion  [facility]  [phone]      If you have a gauze eye patch on, please do not remove it until it is removed by your physician at your first post-operative visit.      If you have a clear shield on, you may remove it and begin your eye drops when you get home.      Wear the eye shield when sleeping for protection until your doctor discontinues it      Do not rub the operated eye.      Light sensitivity may be noticed. Sunglasses may be worn for comfort.      Some discomfort and irritation may be noticed. Acetaminophen (Tylenol) or Ibuprofen (Advil) may be taken for discomfort.      Avoid excessive bending over, strenuous activity or heavy lifting (more than 20 pounds) for one week.      Keep the operated eye dry.      You may wash your hair, bathe or shower, but keep the operated eye closed while doing so.      Use medication exactly as prescribed by your doctor.  You may restart your regular home medications.      Bring all materials and medications to the clinic on your first post-operative visit.        Call the doctor s office if any of the following should occur:  -  any sudden vision  change  -  nausea or a severe headache  -  increase in pain not controlled  -  increased amount of floaters (black spots in front of vision)  -  or signs of infection (pus, increasing redness or tenderness)         Dr. JESSI Concepcion  Post Operative DSEK Instructions      AFTER DSEK SURGERY INSTRUCTIONS    I. General Reminders  a. Continue any medication from your general medical doctor unless instructed otherwise.  b. Call immediately if you have any problems or questions.  II. Symptoms that often occur after surgery  a. Scratchiness  b. Foreign body sensation  III.  ALERT  symptoms - Call immediately should these occur ([phone])  a. Deep aching pain with headache and/or nausea  IV. Post-operative care  a. Lie flat in bed until your follow up visit the day after surgery.  b. You will be using eye drops following surgery.  The nurse will explain them and there will also be instructions on the bottles.  V. The first couple weeks following surgery.  a. You will wear a shield at bedtime until your doctor tells you to discontinue (usually around 2 weeks).  b. Minimize eye rubbing or heavy lifting for the first week following surgery.  c. No swimming, hot tubs, or whirlpools for 2 weeks.  Showers are ok after your follow up visit the day after surgery.  d. You will typically need to be seen for follow-up and post-operative care the day after surgery, at 2 weeks, 1-2 months, and 6 months following surgery.  You will likely need visits every 6 months to 1 year.  e. It is advised that you do not receive preventive flu or pneumonia shots after corneal transplant procedures as this could lead to increased risk of corneal rejection.  VI. Additional Postoperative Instructions  a. Although you will be awake and alert in the recovery room, small amounts of anesthetic will remain in your body for at least 24 hours and you may feel tired and sleepy for the remainder of the day.  You may get up to use the restroom and for meals.   It is advisable to have someone with you at home for the remainder of the day.  b. Drink plenty of fluids.  Alcoholic beverages should be avoided for 24 hours after your anesthesia or intravenous sedation.  c. Prolonged nausea, vomiting, loss of vision, discharge (other than tearing), or severe pain should be reported to your doctor.  Some discomfort in the operative eye the day of surgery is normal.

## 2019-01-08 NOTE — ANESTHESIA POSTPROCEDURE EVALUATION
Patient: Nirmal Ferris    Procedure(s):  COMPLEX RIGHT EYE PHACOEMULSIFICATION WITH STANDARD INTRAOCULAR LENS IMPLANT, DESCEMETS STRIPPING ENDOTHELIAL KERATOPLASTY,  INTRAOP PERIPHERIAL IRRIDOTOMY    Diagnosis:ENDOTHELIAL DYSTROPHY AND CATARACT RIGHT EYE  Diagnosis Additional Information: No value filed.    Anesthesia Type:  MAC    Note:  Anesthesia Post Evaluation    Patient location during evaluation: PACU  Patient participation: Able to fully participate in evaluation  Level of consciousness: awake  Pain management: adequate  Airway patency: patent  Cardiovascular status: acceptable  Respiratory status: acceptable  Hydration status: acceptable  PONV: none     Anesthetic complications: None          Last vitals:  Vitals:    01/08/19 0736 01/08/19 0950 01/08/19 1016   BP: 133/86 116/79 144/74   Resp: 16 16 16   Temp: 35.9  C (96.6  F)     SpO2: 94% 96% 96%         Electronically Signed By: FLOR TRAN MD  January 8, 2019  12:04 PM

## 2019-01-08 NOTE — OP NOTE
Procedure Date: 01/08/2019      PREOPERATIVE DIAGNOSES:   1.  Fuchs corneal dystrophy.   2.  Cataract.   3.  Preoperative pupillary miosis.      POSTOPERATIVE DIAGNOSES:     1.  Fuchs corneal dystrophy.   2.  Cataract.   3.  Preoperative pupillary miosis.      PROCEDURES:  Descemet stripping endothelial keratoplasty, surgical, peripheral iridotomy, complex phacoemulsification cataract extraction with posterior chamber lens implantation, right eye.      SURGEON:  Tl Concepcion MD      ANESTHESIA:  Retrobulbar block sedation.      COMPLICATIONS:  None.      INDICATIONS:  Mr. Ferris is a patient who has a history of both Fuchs dystrophy and cataracts.  He has had successful surgery in his other eye and wishes to proceed with a combined cataract surgery and transplant in his right eye.  He understands the risks and benefits of surgery including loss of vision, loss the eye, hemorrhage, infection and other rare but possible side effects.        DESCRIPTION OF PROCEDURE:  After informed consent was obtained, the patient was given a retrobulbar block using a mixture of lidocaine, Wydase and Marcaine.  He was then sterilely prepped and draped.  A lid speculum was placed.  Paracentesis sites were created at 3 different locations.  Viscoelastic was instilled in the anterior chamber.  A 2.4 mm incision was then created at the 9 o'clock position.  The patient had a preoperative small pupil and for this reason, a 6.25 mm Malyugin ring was placed.  A continuous curvilinear capsulorrhexis was performed.  This was followed by hydrodissection.  The lens was then phacoemulsified.  The cortex was aspirated.  The posterior chamber lens was inserted in the capsular bag.  This was a ZCB +18.5 diopter lens.  The Malyugin ring was removed.  Descemet stripping was performed using a reverse Sinskey hook.  The membrane was harvested using a Utrata forceps.  A Cullen scraper was used to roughen up the underside of the cornea peripherally.   The donor cornea was prepared using an 8.0 mm punch.  It was then loaded into an EndoSerter and then inserted through a 4 mm incision.  The incision was sealed using a double throw 10-0 nylon suture.  Air was instilled in the anterior chamber.  The graft was centered and a full air bubble was placed for 10 minutes.  After 10 minutes, this was replaced to an 80% air fill.  Prior to placing the graft, I should note that a peripheral iridotomy was performed using a reverse Sinskey hook and a cyclodialysis spatula.  Patency was ensured using a cannula.  After the 80% air bubble was filled, a bandage contact lens was placed.  The eye was given injections of Kenalog and ceftazidime.  A drop of Betadine and Timolol followed.  This was followed by a sterile eye patch and Leonard shield.  The patient was allowed to rest in a face-up position for 1 hour and then allowed to go home.  There were no complications.         BAR SOSA MD             D: 2019   T: 2019   MT: JIAN      Name:     OFELIA BANKS   MRN:      [id number]        Account:        [id number]   :      1950           Procedure Date: 2019      Document: [id number]

## 2019-01-08 NOTE — ANESTHESIA PREPROCEDURE EVALUATION
Anesthesia Pre-Procedure Evaluation    Patient: Nirmal Ferris   MRN: [id number] : 1950          Preoperative Diagnosis: ENDOTHELIAL DYSTROPHY AND CATARACT RIGHT EYE    Procedure(s):  KERATOPLASTY DESCEMETS STRIPPING ENDOTHELIAL (DSEK) WITH PHACO    Past Medical History:   Diagnosis Date     Arthritis     OA left knee     COPD (chronic obstructive pulmonary disease) (H)      History of substance abuse     Poly substance abuse     HLD (hyperlipidemia)      Hoarseness      Overweight      Schizophrenia (H)      Sleep apnea     refuses CPAP     Uncomplicated asthma      Past Surgical History:   Procedure Laterality Date     APPENDECTOMY       BACK SURGERY      discectomy      IRIDOTOMY Left 10/1/2018    Procedure: IRIDOTOMY;;  Surgeon: Tl Concepcion MD;  Location: [facility]     KERATOPLASTY DESCEMETS STRIPPING ENDOTHELIAL (DSEK) WITH PHACO IOL Left 10/1/2018    Procedure: KERATOPLASTY ENDOTHELIAL DESCEMET STRIPPING WITH PHACO IOL;  COMPLEX LEFT PHACOEMULSIFICATION WITH STANDARD INTRAOCULAR LENS IMPLANT; LEFT DESCEMET ENDOTHELIAL KERATOPLASTY, SURGICAL IRIDOTOMY;  Surgeon: Tl Concepcion MD;  Location: [facility]     ORTHOPEDIC SURGERY      age 3 club foot surgery       Anesthesia Evaluation     .             ROS/MED HX    ENT/Pulmonary:     (+)sleep apnea, tobacco use, Past use Mild Persistent asthma Treatment: Inhaler daily,  COPD, doesn't use CPAP , . .    Neurologic:       Cardiovascular:     (+) Dyslipidemia, hypertension----. : . . . :. .       METS/Exercise Tolerance:     Hematologic:         Musculoskeletal:         GI/Hepatic:         Renal/Genitourinary:         Endo:     (+) Obesity, .      Psychiatric:     (+) psychiatric history schizophrenia      Infectious Disease:         Malignancy:         Other: Comment: Hx polysubstance abuse;                         Physical Exam  Normal systems: cardiovascular, pulmonary and dental    Airway   Mallampati: II  TM distance: >3 FB  Neck ROM:  "full    Dental   (+) implants and partials    Cardiovascular       Pulmonary             No results found for: WBC, HGB, HCT, PLT, CRP, SED, NA, POTASSIUM, CHLORIDE, CO2, BUN, CR, GLC, SEVERO, PHOS, MAG, ALBUMIN, PROTTOTAL, ALT, AST, GGT, ALKPHOS, BILITOTAL, BILIDIRECT, LIPASE, AMYLASE, RICKY, PTT, INR, FIBR, TSH, T4, T3, HCG, HCGS, CKTOTAL, CKMB, TROPN    Preop Vitals  BP Readings from Last 3 Encounters:   01/08/19 133/86   10/01/18 131/75    Pulse Readings from Last 3 Encounters:   No data found for Pulse      Resp Readings from Last 3 Encounters:   01/08/19 16   10/01/18 16    SpO2 Readings from Last 3 Encounters:   01/08/19 94%   10/01/18 97%      Temp Readings from Last 1 Encounters:   01/08/19 35.9  C (96.6  F) (Temporal)    Ht Readings from Last 1 Encounters:   01/08/19 1.753 m (5' 9\")      Wt Readings from Last 1 Encounters:   01/08/19 105.2 kg (232 lb)    Estimated body mass index is 34.26 kg/m  as calculated from the following:    Height as of this encounter: 1.753 m (5' 9\").    Weight as of this encounter: 105.2 kg (232 lb).       Anesthesia Plan      History & Physical Review  History and physical reviewed and following examination; no interval change.    ASA Status:  2 .    NPO Status:  > 8 hours    Plan for MAC   PONV prophylaxis:  Ondansetron (or other 5HT-3)       Postoperative Care  Postoperative pain management:  IV analgesics.      Consents  Anesthetic plan, risks, benefits and alternatives discussed with:  Patient..                 FLOR TRAN MD  "

## 2019-04-18 ENCOUNTER — RECORDS - HEALTHEAST (OUTPATIENT)
Dept: LAB | Facility: CLINIC | Age: 69
End: 2019-04-18

## 2019-04-18 LAB
ANION GAP SERPL CALCULATED.3IONS-SCNC: 12 MMOL/L (ref 5–18)
BASOPHILS # BLD AUTO: 0.1 THOU/UL (ref 0–0.2)
BASOPHILS NFR BLD AUTO: 1 % (ref 0–2)
BUN SERPL-MCNC: 13 MG/DL (ref 8–22)
CALCIUM SERPL-MCNC: 9.9 MG/DL (ref 8.5–10.5)
CHLORIDE BLD-SCNC: 105 MMOL/L (ref 98–107)
CO2 SERPL-SCNC: 23 MMOL/L (ref 22–31)
CREAT SERPL-MCNC: 0.86 MG/DL (ref 0.7–1.3)
EOSINOPHIL # BLD AUTO: 0.2 THOU/UL (ref 0–0.4)
EOSINOPHIL NFR BLD AUTO: 2 % (ref 0–6)
ERYTHROCYTE [DISTWIDTH] IN BLOOD BY AUTOMATED COUNT: 13.2 % (ref 11–14.5)
GFR SERPL CREATININE-BSD FRML MDRD: >60 ML/MIN/1.73M2
GLUCOSE BLD-MCNC: 123 MG/DL (ref 70–125)
HCT VFR BLD AUTO: 44.6 % (ref 40–54)
HGB BLD-MCNC: 15.4 G/DL (ref 14–18)
LYMPHOCYTES # BLD AUTO: 2.6 THOU/UL (ref 0.8–4.4)
LYMPHOCYTES NFR BLD AUTO: 30 % (ref 20–40)
MCH RBC QN AUTO: 32.6 PG (ref 27–34)
MCHC RBC AUTO-ENTMCNC: 34.5 G/DL (ref 32–36)
MCV RBC AUTO: 95 FL (ref 80–100)
MONOCYTES # BLD AUTO: 0.7 THOU/UL (ref 0–0.9)
MONOCYTES NFR BLD AUTO: 8 % (ref 2–10)
NEUTROPHILS # BLD AUTO: 5.3 THOU/UL (ref 2–7.7)
NEUTROPHILS NFR BLD AUTO: 60 % (ref 50–70)
PLATELET # BLD AUTO: 301 THOU/UL (ref 140–440)
PMV BLD AUTO: 9.2 FL (ref 8.5–12.5)
POTASSIUM BLD-SCNC: 4.1 MMOL/L (ref 3.5–5)
RBC # BLD AUTO: 4.72 MILL/UL (ref 4.4–6.2)
SODIUM SERPL-SCNC: 140 MMOL/L (ref 136–145)
WBC: 8.9 THOU/UL (ref 4–11)

## 2020-01-21 ENCOUNTER — RECORDS - HEALTHEAST (OUTPATIENT)
Dept: LAB | Facility: CLINIC | Age: 70
End: 2020-01-21

## 2020-01-21 LAB
ALBUMIN SERPL-MCNC: 3.9 G/DL (ref 3.5–5)
ALP SERPL-CCNC: 117 U/L (ref 45–120)
ALT SERPL W P-5'-P-CCNC: 18 U/L (ref 0–45)
ANION GAP SERPL CALCULATED.3IONS-SCNC: 10 MMOL/L (ref 5–18)
AST SERPL W P-5'-P-CCNC: 17 U/L (ref 0–40)
BILIRUB SERPL-MCNC: 1.1 MG/DL (ref 0–1)
BUN SERPL-MCNC: 12 MG/DL (ref 8–22)
CALCIUM SERPL-MCNC: 9.5 MG/DL (ref 8.5–10.5)
CHLORIDE BLD-SCNC: 102 MMOL/L (ref 98–107)
CHOLEST SERPL-MCNC: 169 MG/DL
CO2 SERPL-SCNC: 24 MMOL/L (ref 22–31)
CREAT SERPL-MCNC: 0.83 MG/DL (ref 0.7–1.3)
FASTING STATUS PATIENT QL REPORTED: NORMAL
GFR SERPL CREATININE-BSD FRML MDRD: >60 ML/MIN/1.73M2
GLUCOSE BLD-MCNC: 96 MG/DL (ref 70–125)
HDLC SERPL-MCNC: 62 MG/DL
LDLC SERPL CALC-MCNC: 90 MG/DL
POTASSIUM BLD-SCNC: 4.4 MMOL/L (ref 3.5–5)
PROT SERPL-MCNC: 7.4 G/DL (ref 6–8)
PSA SERPL-MCNC: <0.1 NG/ML (ref 0–4.5)
SODIUM SERPL-SCNC: 136 MMOL/L (ref 136–145)
TRIGL SERPL-MCNC: 84 MG/DL

## 2020-07-27 ENCOUNTER — RECORDS - HEALTHEAST (OUTPATIENT)
Dept: LAB | Facility: CLINIC | Age: 70
End: 2020-07-27

## 2020-07-27 LAB
ALBUMIN SERPL-MCNC: 4.1 G/DL (ref 3.5–5)
ALP SERPL-CCNC: 125 U/L (ref 45–120)
ALT SERPL W P-5'-P-CCNC: 21 U/L (ref 0–45)
ANION GAP SERPL CALCULATED.3IONS-SCNC: 12 MMOL/L (ref 5–18)
AST SERPL W P-5'-P-CCNC: 19 U/L (ref 0–40)
BILIRUB SERPL-MCNC: 0.9 MG/DL (ref 0–1)
BUN SERPL-MCNC: 14 MG/DL (ref 8–22)
CALCIUM SERPL-MCNC: 9.7 MG/DL (ref 8.5–10.5)
CHLORIDE BLD-SCNC: 102 MMOL/L (ref 98–107)
CO2 SERPL-SCNC: 21 MMOL/L (ref 22–31)
CREAT SERPL-MCNC: 0.95 MG/DL (ref 0.7–1.3)
GFR SERPL CREATININE-BSD FRML MDRD: >60 ML/MIN/1.73M2
GLUCOSE BLD-MCNC: 124 MG/DL (ref 70–125)
POTASSIUM BLD-SCNC: 4.2 MMOL/L (ref 3.5–5)
PROT SERPL-MCNC: 7.6 G/DL (ref 6–8)
SODIUM SERPL-SCNC: 135 MMOL/L (ref 136–145)

## 2020-12-09 ENCOUNTER — RECORDS - HEALTHEAST (OUTPATIENT)
Dept: ADMINISTRATIVE | Facility: OTHER | Age: 70
End: 2020-12-09

## 2020-12-09 ENCOUNTER — RECORDS - HEALTHEAST (OUTPATIENT)
Dept: LAB | Facility: CLINIC | Age: 70
End: 2020-12-09

## 2020-12-09 LAB
ANION GAP SERPL CALCULATED.3IONS-SCNC: 11 MMOL/L (ref 5–18)
BUN SERPL-MCNC: 15 MG/DL (ref 8–28)
CALCIUM SERPL-MCNC: 9.5 MG/DL (ref 8.5–10.5)
CHLORIDE BLD-SCNC: 104 MMOL/L (ref 98–107)
CO2 SERPL-SCNC: 23 MMOL/L (ref 22–31)
CREAT SERPL-MCNC: 0.84 MG/DL (ref 0.7–1.3)
GFR SERPL CREATININE-BSD FRML MDRD: >60 ML/MIN/1.73M2
GLUCOSE BLD-MCNC: 118 MG/DL (ref 70–125)
POTASSIUM BLD-SCNC: 4 MMOL/L (ref 3.5–5)
SODIUM SERPL-SCNC: 138 MMOL/L (ref 136–145)

## 2020-12-21 ENCOUNTER — RECORDS - HEALTHEAST (OUTPATIENT)
Dept: ADMINISTRATIVE | Facility: OTHER | Age: 70
End: 2020-12-21

## 2020-12-22 ENCOUNTER — COMMUNICATION - HEALTHEAST (OUTPATIENT)
Dept: PULMONOLOGY | Facility: OTHER | Age: 70
End: 2020-12-22

## 2020-12-29 ENCOUNTER — COMMUNICATION - HEALTHEAST (OUTPATIENT)
Dept: PULMONOLOGY | Facility: OTHER | Age: 70
End: 2020-12-29

## 2021-01-05 ENCOUNTER — AMBULATORY - HEALTHEAST (OUTPATIENT)
Dept: PULMONOLOGY | Facility: OTHER | Age: 71
End: 2021-01-05

## 2021-01-05 ENCOUNTER — COMMUNICATION - HEALTHEAST (OUTPATIENT)
Dept: PULMONOLOGY | Facility: OTHER | Age: 71
End: 2021-01-05

## 2021-01-05 DIAGNOSIS — J45.909 ASTHMA: ICD-10-CM

## 2021-01-29 ENCOUNTER — RECORDS - HEALTHEAST (OUTPATIENT)
Dept: ADMINISTRATIVE | Facility: OTHER | Age: 71
End: 2021-01-29

## 2021-01-29 ENCOUNTER — RECORDS - HEALTHEAST (OUTPATIENT)
Dept: PULMONOLOGY | Facility: OTHER | Age: 71
End: 2021-01-29

## 2021-01-29 ENCOUNTER — OFFICE VISIT - HEALTHEAST (OUTPATIENT)
Dept: PULMONOLOGY | Facility: OTHER | Age: 71
End: 2021-01-29

## 2021-01-29 DIAGNOSIS — J44.9 COPD, GROUP D, BY GOLD 2017 CLASSIFICATION (H): ICD-10-CM

## 2021-01-29 DIAGNOSIS — G47.33 OSA (OBSTRUCTIVE SLEEP APNEA): ICD-10-CM

## 2021-01-29 DIAGNOSIS — J44.9 COPD, GROUP B, BY GOLD 2017 CLASSIFICATION (H): ICD-10-CM

## 2021-01-29 DIAGNOSIS — E66.01 MORBID OBESITY (H): ICD-10-CM

## 2021-01-29 DIAGNOSIS — J45.909 UNSPECIFIED ASTHMA, UNCOMPLICATED: ICD-10-CM

## 2021-01-29 LAB — HGB BLD-MCNC: 11 G/DL

## 2021-01-29 ASSESSMENT — MIFFLIN-ST. JEOR: SCORE: 1849.78

## 2021-04-09 ENCOUNTER — AMBULATORY - HEALTHEAST (OUTPATIENT)
Dept: PULMONOLOGY | Facility: OTHER | Age: 71
End: 2021-04-09

## 2021-04-09 ENCOUNTER — HOSPITAL ENCOUNTER (OUTPATIENT)
Dept: RADIOLOGY | Facility: HOSPITAL | Age: 71
Discharge: HOME | End: 2021-04-09
Attending: INTERNAL MEDICINE

## 2021-04-09 ENCOUNTER — OFFICE VISIT - HEALTHEAST (OUTPATIENT)
Dept: PULMONOLOGY | Facility: OTHER | Age: 71
End: 2021-04-09

## 2021-04-09 DIAGNOSIS — J44.1 COPD EXACERBATION (H): ICD-10-CM

## 2021-04-09 DIAGNOSIS — J44.9 CHRONIC OBSTRUCTIVE PULMONARY DISEASE, UNSPECIFIED COPD TYPE (H): ICD-10-CM

## 2021-04-09 DIAGNOSIS — R09.02 EXERCISE HYPOXEMIA: ICD-10-CM

## 2021-04-13 ENCOUNTER — AMBULATORY - HEALTHEAST (OUTPATIENT)
Dept: MULTI SPECIALTY CLINIC | Facility: CLINIC | Age: 71
End: 2021-04-13

## 2021-04-13 DIAGNOSIS — J44.1 COPD EXACERBATION (H): ICD-10-CM

## 2021-05-28 ENCOUNTER — RECORDS - HEALTHEAST (OUTPATIENT)
Dept: ADMINISTRATIVE | Facility: CLINIC | Age: 71
End: 2021-05-28

## 2021-06-05 VITALS
BODY MASS INDEX: 40.01 KG/M2 | OXYGEN SATURATION: 93 % | DIASTOLIC BLOOD PRESSURE: 60 MMHG | HEIGHT: 68 IN | SYSTOLIC BLOOD PRESSURE: 130 MMHG | WEIGHT: 264 LBS | HEART RATE: 105 BPM

## 2021-06-05 VITALS
HEIGHT: 68 IN | SYSTOLIC BLOOD PRESSURE: 150 MMHG | DIASTOLIC BLOOD PRESSURE: 78 MMHG | BODY MASS INDEX: 37.13 KG/M2 | OXYGEN SATURATION: 96 % | WEIGHT: 245 LBS | HEART RATE: 84 BPM

## 2021-06-14 NOTE — PROGRESS NOTES
Pulmonary Clinic Outpatient Consultation    Assessment and Plan:   70 year old man with history of schizophrenia, obesity, former smoker, prior diagnosis of asthma, JOSSELIN not on CPAP, presents for evaluation of wheezing and dyspnea. He is quite wheezy on exam today. PFTs show moderate-severe airflow obstruction without reversibility and normal DLco. Overall he appears to have GOLD class B COPD (higher symptom burden, lower risk for exacerbation/hospitalizaiton). High risk for escalating to GOLD class D. We will try to optimize his inhaler regimen today. Discussed the importance of sleep apnea testing and pulmonary rehab as well. He is hesistant to commit to anything other than the inhalers right now and is focused on getting his knee fixed.    Recommendations:  - continue Symbicort 160-4.5 2 puffs two times a day. Add spacer. Technique demonstrated in clinic today. Rinse/gargle spit after each use  - add Incruse ellipta 1 puff once daily.  - order for nebulizer machine. Demonstrated in clinic today. Ordered Duonebs. Recommended he use it 3-4 times per day for the next few days to see if this helps.   Due to presence of wheezing and desaturation, home nebulizer therapy will be beneficial. The patient has tried (or considered) medications with limited success and oxygen is still required.  This patient is mobile in the home and requires portability.    - prednisone 40mg daily for 5 days  - continue albuterol rescue inhaler as needed.  - strongly recommended sleep medicine referral, he wants to hold off. Discussed risks of untreated sleep apnea on cardiovascular health, risk of stroke, etc.   - strongly recommended pulmonary rehab, he is unable to commit right now  - encouraged him to exercise and remain active as able  - walking oximetry test next visit  - UTD with flu and pneumococcal vaccinations.    All questions answered.  Follow up in 1-2 months for reassessment.     Maximo Xie MD (Avi)  [facility] [place]  [facility]  Pager [phone]  Clinic [phone]      CCx: asthma, wheezing    HPI: 70 year old man with history of schizophrenia, obesity, former smoker, prior diagnosis of asthma, JOSSELIN not on CPAP, presents for evaluation of wheezing and dyspnea.  He has had wheezing and dyspnea for many years.  It seems to have worsened recently.  He had knee and hip replacements recently and recovered well from those. He gets dyspnea with one flight of stairs. He doesn't walk much due to his joint pains. He is pretty limited in mobility.  No recent hospitalizations or ER visits.  No exacerbations requiring prednisone or abx recently.  His last PSG was in 2006. He didn't like wearing CPAP because he would have to get up multiple times per night.  His brother in law notes that occasionally his O2 sat drops to 87% and HR increases when he exerts himself otherwise his O2 sat is >94% at rest most of the time.     Of note, he is accompanied by his brother in law today who is a retired internist. His brother in law provided additional information today.    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 His 2 children. Lives with one son.     ROS:  A 12-system review was obtained and was negative with the exception of the symptoms endorsed in the history of present illness.    PMH:  Past Medical History:   Diagnosis Date     COPD, group B, by GOLD 2017 classification (H)      COPD, group D, by GOLD 2017 classification (H)      Former smoker      Obesity      JOSSELIN (obstructive sleep apnea)     not on CPAP     Schizophrenia (H)        PSH:  No past surgical history on file.    Allergies:  Allergies   Allergen Reactions     Penicillins Hives       Family HX:  No family history on file.    Social Hx:  Social History     Socioeconomic History     Marital status: Single     Spouse name: Not on file     Number of children: Not on file     Years of education: Not on file     Highest education level: Not on  file   Occupational History     Not on file   Social Needs     Financial resource strain: Not on file     Food insecurity     Worry: Not on file     Inability: Not on file     Transportation needs     Medical: Not on file     Non-medical: Not on file   Tobacco Use     Smoking status: Former Smoker     Packs/day: 1.50     Years: 14.00     Pack years: 21.00     Quit date: 1985     Years since quittin.1     Smokeless tobacco: Never Used   Substance and Sexual Activity     Alcohol use: Not on file     Drug use: Not on file     Sexual activity: Not on file   Lifestyle     Physical activity     Days per week: Not on file     Minutes per session: Not on file     Stress: Not on file   Relationships     Social connections     Talks on phone: Not on file     Gets together: Not on file     Attends Spiritism service: Not on file     Active member of club or organization: Not on file     Attends meetings of clubs or organizations: Not on file     Relationship status: Not on file     Intimate partner violence     Fear of current or ex partner: Not on file     Emotionally abused: Not on file     Physically abused: Not on file     Forced sexual activity: Not on file   Other Topics Concern     Not on file   Social History Narrative     Not on file       Current Meds:  Current Outpatient Medications   Medication Sig Dispense Refill     acetaminophen (TYLENOL) 500 MG tablet Take 1,000 mg by mouth.       albuterol (PROAIR HFA;PROVENTIL HFA;VENTOLIN HFA) 90 mcg/actuation inhaler Inhale 2 puffs.       ARIPiprazole (ABILIFY) 20 MG tablet Take 20 mg by mouth.       aspirin 81 mg chewable tablet Chew 81 mg.       budesonide-formoteroL (SYMBICORT) 160-4.5 mcg/actuation inhaler Inhale 2 puffs.       buPROPion (WELLBUTRIN XL) 300 MG 24 hr tablet Take 300 mg by mouth.       diltiazem (CARDIZEM) 120 MG tablet Take by mouth.       doxepin (SINEQUAN) 10 MG capsule Take 1-2 caps every evening       losartan (COZAAR) 100 MG tablet Take 100  "mg by mouth.       naproxen (NAPROSYN) 500 MG tablet Take 500 mg by mouth 2 (two) times a day with meals.       senna (SENOKOT) 8.6 mg tablet Take 1 tablet by mouth.       tamsulosin (FLOMAX) 0.4 mg cap Take 0.4 mg by mouth.       traZODone (DESYREL) 100 MG tablet Take 100 mg by mouth.       ipratropium-albuteroL (DUO-NEB) 0.5-2.5 mg/3 mL nebulizer Take 3 mL by nebulization 4 (four) times a day as needed. 120 vial 6     umeclidinium (INCRUSE ELLIPTA) 62.5 mcg/actuation DsDv inhaler Inhale 1 puff daily. 1 Inhaler 12     No current facility-administered medications for this visit.        Physical Exam:  /78   Pulse 84   Ht 5' 8.25\" (1.734 m)   Wt (!) 245 lb (111.1 kg)   SpO2 96%   BMI 36.98 kg/m    Gen: awake, alert, oriented, no distress  HEENT: nasal turbinates are unremarkable, no oropharyngeal lesions, no cervical or supraclavicular lymphadenopathy. Trachea midline. No tracheal stridor noted.   CV: RRR, no M/G/R  Resp: fair air movement. Diffuse wheezing and rhonchi throughout.   Abd: soft, nontender, no palpable organomegaly  Skin: no apparent rashes  Ext: no cyanosis, clubbing or edema  Neuro: alert, nonfocal    Labs:  Reviewed  Chem panel OK  hgb 11.0    Imaging studies:  None available    Pulmonary Function Testing  1/29/2021  Note: patient took symbicort prior to testing.    FEV1 1.71L 55%  FVC 69%  Ratio 0.61  Mid-flow rates also reduced.   No BD response  No lung volumes  DLco 120% olya for hgb.  Flow volume loop suggests obstruction.  Impression: moderate-severe obstruction. No BD response. Borderline elevated DLco when olya for hgb, which could suggest asthma/reactive airways disease.   "

## 2021-06-14 NOTE — PROGRESS NOTES
Patient instructed in use of nebulizer machine from [facility]. Patient states good understanding of how to use the nebulizer machine. All paperwork signed and scanned to patient chart.  Phone numbers given to patient to call with any questions.

## 2021-06-16 PROBLEM — Z96.642 STATUS POST TOTAL HIP REPLACEMENT, LEFT: Status: ACTIVE | Noted: 2019-05-22

## 2021-06-16 PROBLEM — M17.12 PRIMARY OSTEOARTHRITIS OF LEFT KNEE: Status: ACTIVE | Noted: 2020-10-21

## 2021-06-16 PROBLEM — E66.01 MORBID OBESITY (H): Status: ACTIVE | Noted: 2021-01-29

## 2021-06-16 NOTE — PROGRESS NOTES
I spoke with Nirmal on the phone after our visit. He did get his oxygen delivered already. Reviewed his CXR which showed subtle infiltrate on the left concerning for pneumonia. I would like for him to take prednisone and azithromycin for COPD exacerbation. Nirmla said his prednisone would not be mailed to him until Tuesday. I asked Nirmal if he understood that his breathing could get much worse over the weekend without treatment. I am not sure he fully appreciates the seriousness of the situation.  I then got permission from Nirmal to call his brother in law and sister in law, Dr. Bronson and Lorraine (I met Lorraine earlier today in the office), which he agreed to. I relayed my concerns to his family. They are going to  the prednisone and z-pack and deliver it to Nirmal today.  I once again expressed my concern that at this point I am unable to provide adequate pulmonary treatments for Nirmal as an outpatient and strongly encouraged them to bring him into the hospital. They agreed to bring him on Monday or sooner if he feels worse. I will also ask his PMD to get involved in this as this is an unusual situation.  I will have our office staff follow up with him on Monday.    Maximo Xie MD (Avi)  [facility]  Pager [phone]  Clinic [phone]

## 2021-06-16 NOTE — PROGRESS NOTES
Pulmonary Clinic Follow-up Visit    Assessment and Plan:   70 year old man with history of schizophrenia, obesity, former smoker, prior diagnosis of asthma, JOSSELIN not on CPAP, presents for follow up of COPD GOLD class B with recent exacerbation. He appears to be doing poorly with ongoing dyspnea and cough. He is hypoxemic today and requiring oxygen.  At this point I do not think we can safely manage him as an outpatient and I am not sure how compliant he is with outpatient regimen. I recommended hospitalization for inpatient management of COPD exacerbation, but he adamantly declines. He agreed to come back to the ER on Monday. I discussed my concerns with him and Lorraine at length, and he understands the risk of going home without hospitalization.  In the meantime I am going to give him some more prednisone, get a chest x-ray, and prescribe home oxygen. I also advised him to use his nebs four times a day through the weekend. I advised him to return to the ER at [facility] on Monday for evaluation, or sooner if his breathing worsens.   I told Nirmal that he would be unlikely to have surgery on his knee until his breathing and pulmonary health is optimized.      Recommendations:  - CXR PA/lateral today  - 5 more days of prednisone 40mg daily  - duonebs four times a day through the weekend  - continue Symbicort 160-4.5 2 puffs two times a day with spacer. Will need spacer teaching again at some point. Rinse/gargle/spit after yse  - continue Incruse ellipta 1 puff once daily.  - will Rx supplemental oxygen base don walking oxygen today.  Due to desaturation of SpO2 less than or equal to 88% on room air at rest from COPD/emphysema, home oxygen therapy will benefit my patient's condition. The patient has tried other medications including inhaled and nebulized therapy and steroids with limited success and oxygen is still required. The patient is mobile in the home and requires portability.  This patient is mobile in the home and  requires portability.    - continue albuterol rescue inhaler as needed.  - did not discuss sleep medicine or LDCT today due to time constraints and multiple acute issues.   - did not bring up pulmonary rehab again  - UTD with flu and pneumococcal vaccinations. Also received COVID vaccine.      All questions answered.  Follow up in 4-6 weeks for reassessment.     CCx: COPD follow up    HPI: Interim history: I last saw Nirmal on 1/29/2021. I added a spacer and incruse to his inhaler regimen at the last visit. I also gave him some prednisone because he was wheezing and coughing.  Today, he reports he is doing poorly. He continues to have shortness of breath with any activity.  He showed up with SpO2 in the low 80s, placed on 1Lpm with o2 sat improved to 90s.  Continues to have dry cough. No hemoptysis.  He is doing his nebs daily.  He lost his rescue inhaler.  He is using the symbicort with spacer, he's not sure if he's doing it properly.  He did not find the oral prednisone too helpful last visit.     His sister in law daiana is with him today.    ROS:  A 12-system review was obtained and was negative with the exception of the symptoms endorsed in the history of present illness.    PMH:  Past Medical History:   Diagnosis Date     COPD, group B, by GOLD 2017 classification (H)      Former smoker      Obesity      JOSSELIN (obstructive sleep apnea)     not on CPAP     Schizophrenia (H)        PSH:  No past surgical history on file.    Allergies:  Allergies   Allergen Reactions     Penicillins Hives       Family HX:  No family history on file.    Social Hx:  Social History     Socioeconomic History     Marital status: Single     Spouse name: Not on file     Number of children: Not on file     Years of education: Not on file     Highest education level: Not on file   Occupational History     Not on file   Social Needs     Financial resource strain: Not on file     Food insecurity     Worry: Not on file     Inability: Not on file      Transportation needs     Medical: Not on file     Non-medical: Not on file   Tobacco Use     Smoking status: Former Smoker     Packs/day: 1.50     Years: 14.00     Pack years: 21.00     Quit date: 1985     Years since quittin.2     Smokeless tobacco: Never Used   Substance and Sexual Activity     Alcohol use: Not on file     Drug use: Not on file     Sexual activity: Not on file   Lifestyle     Physical activity     Days per week: Not on file     Minutes per session: Not on file     Stress: Not on file   Relationships     Social connections     Talks on phone: Not on file     Gets together: Not on file     Attends Yazdanism service: Not on file     Active member of club or organization: Not on file     Attends meetings of clubs or organizations: Not on file     Relationship status: Not on file     Intimate partner violence     Fear of current or ex partner: Not on file     Emotionally abused: Not on file     Physically abused: Not on file     Forced sexual activity: Not on file   Other Topics Concern     Not on file   Social History Narrative     Not on file       Current Meds:  Current Outpatient Medications   Medication Sig Dispense Refill     albuterol (PROAIR HFA;PROVENTIL HFA;VENTOLIN HFA) 90 mcg/actuation inhaler Inhale 2 puffs every 6 (six) hours as needed for wheezing. 1 Inhaler 6     ARIPiprazole (ABILIFY) 20 MG tablet Take 20 mg by mouth.       aspirin 81 mg chewable tablet Chew 81 mg.       budesonide-formoteroL (SYMBICORT) 160-4.5 mcg/actuation inhaler Inhale 2 puffs.       buPROPion (WELLBUTRIN XL) 300 MG 24 hr tablet Take 300 mg by mouth.       diltiazem (CARDIZEM) 120 MG tablet Take by mouth.       doxepin (SINEQUAN) 10 MG capsule Take 1-2 caps every evening       ipratropium-albuteroL (DUO-NEB) 0.5-2.5 mg/3 mL nebulizer Take 3 mL by nebulization 4 (four) times a day as needed. 120 vial 6     losartan (COZAAR) 100 MG tablet Take 100 mg by mouth.       naproxen (NAPROSYN) 500 MG tablet  "Take 500 mg by mouth 2 (two) times a day with meals.       senna (SENOKOT) 8.6 mg tablet Take 1 tablet by mouth.       tamsulosin (FLOMAX) 0.4 mg cap Take 0.4 mg by mouth.       traZODone (DESYREL) 100 MG tablet Take 100 mg by mouth.       umeclidinium (INCRUSE ELLIPTA) 62.5 mcg/actuation DsDv inhaler Inhale 1 puff daily. 1 Inhaler 12     predniSONE (DELTASONE) 20 MG tablet Take 40 mg by mouth daily. 5 tablet 0     No current facility-administered medications for this visit.        Physical Exam:  /60   Pulse (!) 105   Ht 5' 8.28\" (1.734 m)   Wt (!) 264 lb (119.7 kg)   SpO2 93% Comment: 1 LPM Cont  BMI 39.81 kg/m    Gen: awake, alert, oriented, mild respiratory difficulty.   HEENT: nasal turbinates are unremarkable, no oropharyngeal lesions, no cervical or supraclavicular lymphadenopathy  CV: RRR, no M/G/R  Resp: diffusely rhonchorous throughout. No wheezing. Fair air movement.   Abd: soft, nontender, no palpable organomegaly  Skin: no apparent rashes  Ext: no cyanosis, clubbing or edema  Neuro: alert, nonfocal    Labs:  Reviewed  Chem panel OK  hgb 11.0    Imaging studies:  No recent chest imaging available.     Pulmonary Function Testing  Jan 2021  FEV1/FVC is 0.61 and is reduced.  FEV1 is 55% predicted and is reduced.  FVC is 69% predicted and is reduced.  There was no improvement in spirometry after a single inhaled dose of bronchodilator.  DLCO is 120% predicted and is normal when it   is corrected for hemoglobin.  The flow volume loop shows obstructive morphology.     Impression:  Spirometry is consistent with moderate-severe obstructive ventilatory defect.  Spirometry is not consistent with reversibility.  Diffusion capacity when corrected for hemoglobin is normal.  "

## 2021-06-16 NOTE — PROGRESS NOTES
Oxygen saturation walk test    Patient oxygen saturation on RA at rest is 83%.    Patient oxygen saturation on 1LPM at rest is 93%.     Oxygen continuous dose testing  While ambulating 150ft on 3LPM continuous dose, oxygen saturation is 90%.      DME Provider: [facility]    Patient is ambulatory within his/her home.

## 2021-06-18 NOTE — PATIENT INSTRUCTIONS - HE
Patient Instructions by Maximo Xie MD at 1/29/2021 11:00 AM     Author: Maximo Xie MD Service: -- Author Type: Physician    Filed: 1/29/2021 12:24 PM Encounter Date: 1/29/2021 Status: Signed    : Maximo Xie MD (Physician)       Add spacer to symbicort. 2 puffs two times a day. Rinse/gargle/spit after use  Add incruse one puff once daily  Nebulizer machine/meds 3-4 times a day for the next week  Prednisone 40mg daily for 5 days  Consider sleep medicine and pulmonary rehab referrals.     ============    Patient Education     Pulmonary Rehabilitation: Getting Started  Pulmonary rehabilitation (rehab) programs can help people with chronic lung disease like chronic obstructive pulmonary disease (COPD) which includes emphysema and chronic bronchitis. And, other conditions like cystic fibrosis, pulmonary fibrosis, and sarcoidosis. The programs are provided by healthcare professionals who will:    Teach you the skills you need to live and breathe better    Encourage you to put these skills to good use through lifestyle changes    Help you set realistic goals so you can make these changes gradually and effectively    Pulmonary rehabilitation professionals  The programs are usually led by healthcare providers including nurses and respiratory therapists. The team may also include exercise specialists, physical and occupational therapists, dietitians, pharmacists, and counselors. Although most programs take place in a group setting, these team members will help you one-on-one when you need it. If you are not in a pulmonary rehab program, ask your healthcare provider or nurse about programs in your area.  Pulmonary rehab programs  The programs may include:    Exercise training    Breathing techniques    How to do things more easily    Nutritional support    Information about your condition    Self-management skills    Help to stop smoking    Emotional support  Making changes that work for you  To reach your  goals, youll probably need to make changes to your lifestyle. To help make changes go more smoothly:    Expect new emotions. Its common to resist or feel angry or scared about having to make changes. Youre not alone. Share your feelings with the pulmonary rehab team and people close to you.    Prepare yourself for slow, steady progress. Change doesnt happen overnight. To feel your best, you need to commit yourself to practicing your new skills. Over time, youll be stronger, have more control of shortness of breath, and be able to do more. But only if you keep at it.    Get support. Get support from family and friends as you try new things. Tell the people in your life how they can help you reach your goals. Share your ideas and tips for success with other members of your pulmonary rehab group. And dont be embarrassed to ask for help.  My goals  Are there things you cant do now that youd like to be able to do when your pulmonary rehab program is finished? Check off the statements below that may apply to you.  I want to:  ? Breathe better.  ? Understand my lung disease and what I can do to feel better.  ? Have more energy to enjoy my family and friends.  ? Rely less on others.  ? Be able to walk further.  ? Be stronger.  ? Enjoy my hobbies and leisure activities.  ? Eat healthier foods.  ? Quit smoking.  ? Feel less anxious and depressed about my condition.  ? Travel and enjoy myself.  ? Have fewer visits to the hospital or emergency room.  Other goals:  ___________________________________________________________  ___________________________________________________________  ___________________________________________________________  ___________________________________________________________   Date Last Reviewed: 11/1/2016 2000-2019 [employer]. [address] [place] [address]. All rights reserved. This information is not intended as a substitute for professional medical care. Always follow  your healthcare professional's instructions.

## 2021-06-21 NOTE — LETTER
Letter by Maximo Xie MD at      Author: Maximo Xie MD Service: -- Author Type: --    Filed:  Encounter Date: 1/29/2021 Status: (Other)         Tl Harrington MD  [address]  [place] [address]                                  January 29, 2021    Patient: Nirmal Ferris   MR Number: [id number]   YOB: 1950   Date of Visit: 1/29/2021     Dear Dr. Len MD:    Thank you for referring Nirmal Ferris to me for evaluation. Below are the relevant portions of my assessment and plan of care.    If you have questions, please do not hesitate to call me. I look forward to following Nirmal along with you.    Sincerely,        Maximo Xie MD          CC  No Recipients  Maximo Xie MD  1/29/2021 12:25 PM  Sign when Signing Visit  Pulmonary Clinic Outpatient Consultation    Assessment and Plan:   70 year old man with history of schizophrenia, obesity, former smoker, prior diagnosis of asthma, OJSSELIN not on CPAP, presents for evaluation of wheezing and dyspnea. He is quite wheezy on exam today. PFTs show moderate-severe airflow obstruction without reversibility and normal DLco. Overall he appears to have GOLD class B COPD (higher symptom burden, lower risk for exacerbation/hospitalizaiton). High risk for escalating to GOLD class D. We will try to optimize his inhaler regimen today. Discussed the importance of sleep apnea testing and pulmonary rehab as well. He is hesistant to commit to anything other than the inhalers right now and is focused on getting his knee fixed.    Recommendations:  - continue Symbicort 160-4.5 2 puffs two times a day. Add spacer. Technique demonstrated in clinic today. Rinse/gargle spit after each use  - add Incruse ellipta 1 puff once daily.  - order for nebulizer machine. Demonstrated in clinic today. Ordered Duonebs. Recommended he use it 3-4 times per day for the next few days to see if this helps.   Due to presence of wheezing and desaturation, home  nebulizer therapy will be beneficial. The patient has tried (or considered) medications with limited success and oxygen is still required.  This patient is mobile in the home and requires portability.    - prednisone 40mg daily for 5 days  - continue albuterol rescue inhaler as needed.  - strongly recommended sleep medicine referral, he wants to hold off. Discussed risks of untreated sleep apnea on cardiovascular health, risk of stroke, etc.   - strongly recommended pulmonary rehab, he is unable to commit right now  - encouraged him to exercise and remain active as able  - walking oximetry test next visit  - UTD with flu and pneumococcal vaccinations.    All questions answered.  Follow up in 1-2 months for reassessment.     Maximo Xie MD (Avi)  [facility]  Pager [phone]  Clinic [phone]      CCx: asthma, wheezing    HPI: 70 year old man with history of schizophrenia, obesity, former smoker, prior diagnosis of asthma, JOSSELIN not on CPAP, presents for evaluation of wheezing and dyspnea.  He has had wheezing and dyspnea for many years.  It seems to have worsened recently.  He had knee and hip replacements recently and recovered well from those. He gets dyspnea with one flight of stairs. He doesn't walk much due to his joint pains. He is pretty limited in mobility.  No recent hospitalizations or ER visits.  No exacerbations requiring prednisone or abx recently.  His last PSG was in 2006. He didn't like wearing CPAP because he would have to get up multiple times per night.  His brother in law notes that occasionally his O2 sat drops to 87% and HR increases when he exerts himself otherwise his O2 sat is >94% at rest most of the time.     Of note, he is accompanied by his brother in law today who is a retired internist. His brother in law provided additional information today.    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 Lives with one son.      ROS:  A 12-system review was obtained and was negative with the exception of the symptoms endorsed in the history of present illness.    PMH:  Past Medical History:   Diagnosis Date   ? COPD, group B, by GOLD 2017 classification (H)    ? COPD, group D, by GOLD 2017 classification (H)    ? Former smoker    ? Obesity    ? JOSSELIN (obstructive sleep apnea)     not on CPAP   ? Schizophrenia (H)        PSH:  No past surgical history on file.    Allergies:  Allergies   Allergen Reactions   ? Penicillins Hives       Family HX:  No family history on file.    Social Hx:  Social History     Socioeconomic History   ? Marital status: Single     Spouse name: Not on file   ? Number of children: Not on file   ? Years of education: Not on file   ? Highest education level: Not on file   Occupational History   ? Not on file   Social Needs   ? Financial resource strain: Not on file   ? Food insecurity     Worry: Not on file     Inability: Not on file   ? Transportation needs     Medical: Not on file     Non-medical: Not on file   Tobacco Use   ? Smoking status: Former Smoker     Packs/day: 1.50     Years: 14.00     Pack years: 21.00     Quit date: 1985     Years since quittin.1   ? Smokeless tobacco: Never Used   Substance and Sexual Activity   ? Alcohol use: Not on file   ? Drug use: Not on file   ? Sexual activity: Not on file   Lifestyle   ? Physical activity     Days per week: Not on file     Minutes per session: Not on file   ? Stress: Not on file   Relationships   ? Social connections     Talks on phone: Not on file     Gets together: Not on file     Attends [employer] service: Not on file     Active member of club or organization: Not on file     Attends meetings of clubs or organizations: Not on file     Relationship status: Not on file   ? Intimate partner violence     Fear of current or ex partner: Not on file     Emotionally abused: Not on file     Physically abused: Not on file     Forced sexual activity: Not on  "file   Other Topics Concern   ? Not on file   Social History Narrative   ? Not on file       Current Meds:  Current Outpatient Medications   Medication Sig Dispense Refill   ? acetaminophen (TYLENOL) 500 MG tablet Take 1,000 mg by mouth.     ? albuterol (PROAIR HFA;PROVENTIL HFA;VENTOLIN HFA) 90 mcg/actuation inhaler Inhale 2 puffs.     ? ARIPiprazole (ABILIFY) 20 MG tablet Take 20 mg by mouth.     ? aspirin 81 mg chewable tablet Chew 81 mg.     ? budesonide-formoteroL (SYMBICORT) 160-4.5 mcg/actuation inhaler Inhale 2 puffs.     ? buPROPion (WELLBUTRIN XL) 300 MG 24 hr tablet Take 300 mg by mouth.     ? diltiazem (CARDIZEM) 120 MG tablet Take by mouth.     ? doxepin (SINEQUAN) 10 MG capsule Take 1-2 caps every evening     ? losartan (COZAAR) 100 MG tablet Take 100 mg by mouth.     ? naproxen (NAPROSYN) 500 MG tablet Take 500 mg by mouth 2 (two) times a day with meals.     ? senna (SENOKOT) 8.6 mg tablet Take 1 tablet by mouth.     ? tamsulosin (FLOMAX) 0.4 mg cap Take 0.4 mg by mouth.     ? traZODone (DESYREL) 100 MG tablet Take 100 mg by mouth.     ? ipratropium-albuteroL (DUO-NEB) 0.5-2.5 mg/3 mL nebulizer Take 3 mL by nebulization 4 (four) times a day as needed. 120 vial 6   ? umeclidinium (INCRUSE ELLIPTA) 62.5 mcg/actuation DsDv inhaler Inhale 1 puff daily. 1 Inhaler 12     No current facility-administered medications for this visit.        Physical Exam:  /78   Pulse 84   Ht 5' 8.25\" (1.734 m)   Wt (!) 245 lb (111.1 kg)   SpO2 96%   BMI 36.98 kg/m    Gen: awake, alert, oriented, no distress  HEENT: nasal turbinates are unremarkable, no oropharyngeal lesions, no cervical or supraclavicular lymphadenopathy. Trachea midline. No tracheal stridor noted.   CV: RRR, no M/G/R  Resp: fair air movement. Diffuse wheezing and rhonchi throughout.   Abd: soft, nontender, no palpable organomegaly  Skin: no apparent rashes  Ext: no cyanosis, clubbing or edema  Neuro: alert, nonfocal    Labs:  Reviewed  Chem panel " OK  hgb 11.0    Imaging studies:  None available    Pulmonary Function Testing  1/29/2021  Note: patient took symbicort prior to testing.    FEV1 1.71L 55%  FVC 69%  Ratio 0.61  Mid-flow rates also reduced.   No BD response  No lung volumes  DLco 120% olya for hgb.  Flow volume loop suggests obstruction.  Impression: moderate-severe obstruction. No BD response. Borderline elevated DLco when olya for hgb, which could suggest asthma/reactive airways disease.

## 2021-06-21 NOTE — LETTER
Letter by Maximo Xie MD at      Author: Maximo Xie MD Service: -- Author Type: --    Filed:  Encounter Date: 4/9/2021 Status: (Other)         Tl Harrington MD  [address] [place] [address]                                  April 9, 2021    Patient: Nirmal Ferris   MR Number: [id number]   YOB: 1950   Date of Visit: 4/9/2021     Dear Dr. Len MD:    Thank you for referring Nirmal Ferris to me for evaluation. Below are the relevant portions of my assessment and plan of care.    If you have questions, please do not hesitate to call me. I look forward to following Nirmal along with you.    Sincerely,        Maximo Xie MD          CC  No Recipients  Maximo Xie MD  4/9/2021 10:58 AM  Sign when Signing Visit  Pulmonary Clinic Follow-up Visit    Assessment and Plan:   70 year old man with history of schizophrenia, obesity, former smoker, prior diagnosis of asthma, JOSSELIN not on CPAP, presents for follow up of COPD GOLD class B with recent exacerbation. He appears to be doing poorly with ongoing dyspnea and cough. He is hypoxemic today and requiring oxygen.  At this point I do not think we can safely manage him as an outpatient and I am not sure how compliant he is with outpatient regimen. I recommended hospitalization for inpatient management of COPD exacerbation, but he adamantly declines. He agreed to come back to the ER on Monday. I discussed my concerns with him and Lorraine at length, and he understands the risk of going home without hospitalization.  In the meantime I am going to give him some more prednisone, get a chest x-ray, and prescribe home oxygen. I also advised him to use his nebs four times a day through the weekend. I advised him to return to the ER at [facility] on Monday for evaluation, or sooner if his breathing worsens.   I told Nirmal that he would be unlikely to have surgery on his knee until his breathing and pulmonary health is optimized.       Recommendations:  - CXR PA/lateral today  - 5 more days of prednisone 40mg daily  - duonebs four times a day through the weekend  - continue Symbicort 160-4.5 2 puffs two times a day with spacer. Will need spacer teaching again at some point. Rinse/gargle/spit after yse  - continue Incruse ellipta 1 puff once daily.  - will Rx supplemental oxygen base don walking oxygen today.  Due to desaturation of SpO2 less than or equal to 88% on room air at rest from COPD/emphysema, home oxygen therapy will benefit my patient's condition. The patient has tried other medications including inhaled and nebulized therapy and steroids with limited success and oxygen is still required. The patient is mobile in the home and requires portability.  This patient is mobile in the home and requires portability.    - continue albuterol rescue inhaler as needed.  - did not discuss sleep medicine or LDCT today due to time constraints and multiple acute issues.   - did not bring up pulmonary rehab again  - UTD with flu and pneumococcal vaccinations. Also received COVID vaccine.      All questions answered.  Follow up in 4-6 weeks for reassessment.     CCx: COPD follow up    HPI: Interim history: I last saw Nirmal on 1/29/2021. I added a spacer and incruse to his inhaler regimen at the last visit. I also gave him some prednisone because he was wheezing and coughing.  Today, he reports he is doing poorly. He continues to have shortness of breath with any activity.  He showed up with SpO2 in the low 80s, placed on 1Lpm with o2 sat improved to 90s.  Continues to have dry cough. No hemoptysis.  He is doing his nebs daily.  He lost his rescue inhaler.  He is using the symbicort with spacer, he's not sure if he's doing it properly.  He did not find the oral prednisone too helpful last visit.     His sister in law daiana is with him today.    ROS:  A 12-system review was obtained and was negative with the exception of the symptoms endorsed in the  history of present illness.    PMH:  Past Medical History:   Diagnosis Date   ? COPD, group B, by GOLD 2017 classification (H)    ? Former smoker    ? Obesity    ? JOSSELIN (obstructive sleep apnea)     not on CPAP   ? Schizophrenia (H)        PSH:  No past surgical history on file.    Allergies:  Allergies   Allergen Reactions   ? Penicillins Hives       Family HX:  No family history on file.    Social Hx:  Social History     Socioeconomic History   ? Marital status: Single     Spouse name: Not on file   ? Number of children: Not on file   ? Years of education: Not on file   ? Highest education level: Not on file   Occupational History   ? Not on file   Social Needs   ? Financial resource strain: Not on file   ? Food insecurity     Worry: Not on file     Inability: Not on file   ? Transportation needs     Medical: Not on file     Non-medical: Not on file   Tobacco Use   ? Smoking status: Former Smoker     Packs/day: 1.50     Years: 14.00     Pack years: 21.00     Quit date: 1985     Years since quittin.2   ? Smokeless tobacco: Never Used   Substance and Sexual Activity   ? Alcohol use: Not on file   ? Drug use: Not on file   ? Sexual activity: Not on file   Lifestyle   ? Physical activity     Days per week: Not on file     Minutes per session: Not on file   ? Stress: Not on file   Relationships   ? Social connections     Talks on phone: Not on file     Gets together: Not on file     Attends Yarsanism service: Not on file     Active member of club or organization: Not on file     Attends meetings of clubs or organizations: Not on file     Relationship status: Not on file   ? Intimate partner violence     Fear of current or ex partner: Not on file     Emotionally abused: Not on file     Physically abused: Not on file     Forced sexual activity: Not on file   Other Topics Concern   ? Not on file   Social History Narrative   ? Not on file       Current Meds:  Current Outpatient Medications   Medication Sig Dispense  "Refill   ? albuterol (PROAIR HFA;PROVENTIL HFA;VENTOLIN HFA) 90 mcg/actuation inhaler Inhale 2 puffs every 6 (six) hours as needed for wheezing. 1 Inhaler 6   ? ARIPiprazole (ABILIFY) 20 MG tablet Take 20 mg by mouth.     ? aspirin 81 mg chewable tablet Chew 81 mg.     ? budesonide-formoteroL (SYMBICORT) 160-4.5 mcg/actuation inhaler Inhale 2 puffs.     ? buPROPion (WELLBUTRIN XL) 300 MG 24 hr tablet Take 300 mg by mouth.     ? diltiazem (CARDIZEM) 120 MG tablet Take by mouth.     ? doxepin (SINEQUAN) 10 MG capsule Take 1-2 caps every evening     ? ipratropium-albuteroL (DUO-NEB) 0.5-2.5 mg/3 mL nebulizer Take 3 mL by nebulization 4 (four) times a day as needed. 120 vial 6   ? losartan (COZAAR) 100 MG tablet Take 100 mg by mouth.     ? naproxen (NAPROSYN) 500 MG tablet Take 500 mg by mouth 2 (two) times a day with meals.     ? senna (SENOKOT) 8.6 mg tablet Take 1 tablet by mouth.     ? tamsulosin (FLOMAX) 0.4 mg cap Take 0.4 mg by mouth.     ? traZODone (DESYREL) 100 MG tablet Take 100 mg by mouth.     ? umeclidinium (INCRUSE ELLIPTA) 62.5 mcg/actuation DsDv inhaler Inhale 1 puff daily. 1 Inhaler 12   ? predniSONE (DELTASONE) 20 MG tablet Take 40 mg by mouth daily. 5 tablet 0     No current facility-administered medications for this visit.        Physical Exam:  /60   Pulse (!) 105   Ht 5' 8.28\" (1.734 m)   Wt (!) 264 lb (119.7 kg)   SpO2 93% Comment: 1 LPM Cont  BMI 39.81 kg/m    Gen: awake, alert, oriented, mild respiratory difficulty.   HEENT: nasal turbinates are unremarkable, no oropharyngeal lesions, no cervical or supraclavicular lymphadenopathy  CV: RRR, no M/G/R  Resp: diffusely rhonchorous throughout. No wheezing. Fair air movement.   Abd: soft, nontender, no palpable organomegaly  Skin: no apparent rashes  Ext: no cyanosis, clubbing or edema  Neuro: alert, nonfocal    Labs:  Reviewed  Chem panel OK  hgb 11.0    Imaging studies:  No recent chest imaging available.     Pulmonary Function " Testing  Jan 2021  FEV1/FVC is 0.61 and is reduced.  FEV1 is 55% predicted and is reduced.  FVC is 69% predicted and is reduced.  There was no improvement in spirometry after a single inhaled dose of bronchodilator.  DLCO is 120% predicted and is normal when it   is corrected for hemoglobin.  The flow volume loop shows obstructive morphology.     Impression:  Spirometry is consistent with moderate-severe obstructive ventilatory defect.  Spirometry is not consistent with reversibility.  Diffusion capacity when corrected for hemoglobin is normal.

## 2021-06-21 NOTE — LETTER
Letter by Maximo Xie MD at      Author: Maximo Xie MD Service: -- Author Type: --    Filed:  Encounter Date: 1/5/2021 Status: (Other)         Nirmal Ferris  [address]  [place] [address]    January 5, 2021    Dear Ivan Barrington,    Welcome to [facility]! Your appointment information is below.   Please bring the following to your appointment:    Insurance Card, so we may scan it for our records    Drivers license or valid ID, so we may scan it for our records    Co-pay (as applicable per your insurance plan)    A current list of your medications including over the counter products such as vitamins and supplements    Your medical records including copies of X-Ray films if you are transferring your care from another clinic.  If you do not have your records, please fill out the release of information form and we will request those records.     Provider: Maximo Xie MD  Appointment Date:   Friday, January 29, 2021  Arrival Time:  10:00 am PFT, 11:00 dr appt.    Location: [address]        [facility], [address]    **Please allow adequate time for your commute and parking. If you are more than 10 minutes late, you may be asked to reschedule.     If you need to cancel or reschedule your appointment, please notify us at least 24 hours prior to your appointment time so we are able to make this time available for another patient.    Thank you for choosing the [facility] for your health care needs. If you have any questions, please do not hesitate to contact us at any time at   [phone]. We look forward to caring for you.     Sincerely,     [facility] [place] staff

## 2021-07-04 NOTE — ADDENDUM NOTE
Addendum Note by Andrew Anderson CMA at 4/9/2021 10:30 AM     Author: Andrew Anderson CMA Service: -- Author Type: Certified Medical Assistant    Filed: 4/9/2021 11:20 AM Encounter Date: 4/9/2021 Status: Signed    : Andrew Anderson CMA (Certified Medical Assistant)    Addended by: ANDREW ANDERSON on: 4/9/2021 11:20 AM        Modules accepted: Orders

## 2021-07-04 NOTE — ADDENDUM NOTE
Addendum Note by Maximo Curran MD at 4/9/2021 10:30 AM     Author: Maximo Curran MD Service: -- Author Type: Physician    Filed: 4/9/2021 12:02 PM Encounter Date: 4/9/2021 Status: Signed    : Maximo Curran MD (Physician)    Addended by: MAXIMO CURRAN on: 4/9/2021 12:02 PM        Modules accepted: Orders

## 2023-01-01 NOTE — OP NOTE
Procedure Date: 10/01/2018      PREOPERATIVE DIAGNOSES:   1.  Fuchs corneal endothelial dystrophy, left eye.   2.  Visually significant cataract, left eye.      POSTOPERATIVE DIAGNOSES:   1.  Fuchs corneal endothelial dystrophy, left eye.   2.  Visually significant cataract, left eye.      PROCEDURE:  Descemet stripping endothelial keratoplasty, 8.0 mm graft using EndoSerter, complex phacoemulsification cataract extraction with posterior chamber lens implantation using a Malyugin ring for preoperative small pupil, posterior chamber lens implantation, left eye.      SURGEON:  Tl Concepcion MD      ANESTHESIA:  Retrobulbar plus IV sedation.      COMPLICATIONS:  None.      INDICATIONS FOR PROCEDURE:  Mr. Ferris is a patient who has a history of both Fuchs corneal dystrophy and cataract.  He was referred to me for surgery.  We discussed the situation and he asked to proceed with endothelial keratoplasty and cataract surgery.  Initially he had been scheduled for Descemet membrane endothelial keratoplasty (DMEK).  The eye bank through a clerical error sent over tissue for Descemet stripping endothelial keratoplasty (DSEK).  Preoperatively, we discussed the difference between these 2 procedures and I gave Mr. Ferris the option of either going forward with the DSEK procedure or rescheduling.  He asked to proceed with the DSEK procedure.  I think the procedures are very similar and should give similar results.  He understood the risks and benefits of the surgery including loss of vision, loss of eye, hemorrhage, infection, graft rejection, graft re-bubbling, and need for additional surgery.  He understood these risks and asked to proceed.      DESCRIPTION OF PROCEDURE:  After informed consent was obtained, the patient was given a retrobulbar block using a mixture of lidocaine, Wydase and Marcaine.  The patient was then sterilely prepped and draped.  A lid speculum was placed.  The cornea was marked using an 8 mm  optical zone marker and an ink pen.  The paracentesis sites were also marked.  Paracentesis were made in 3 different locations.  Healon was instilled in the anterior chamber.  A 2.4 mm keratome was used to enter the anterior chamber at the 9 o'clock clock hour.  The preoperative pupil was small, approximately 4.3 mm.  For this reason, I recommended using a 6.25 mm Malyugin ring.  This was used to facilitate the surgery.  Additional viscoelastic was instilled.  A continuous curvilinear capsulorrhexis was performed.  This was followed by hydrodissection.  The lens was then phacoemulsified to remove the nucleus and cortex.  The cortex was removed using automated irrigation-aspiration.  The posterior capsule was intact.  A ZCB +19.5 diopter lens was inserted into the capsular bag.  An inferior peripheral iridotomy was performed using a reverse Sinskey hook and a cyclodialysis spatula.  It was enlarged slightly with a combination of a reverse Sinskey hook and a regular Sinskey hook.  After patency was ensured, the Descemet stripping portion was begun using a reverse Sinskey hook.  This was followed by a Descemet stripper to remove the endothelium.  A Cullen scraper was used to scrape the underside of the cornea.  An 8.0 mm tissue was prepared using a Pryor press.  It was loaded into an EndoSerter and then injected into the eye under low pressure.  It unfurled nicely.  A double throw 10-0 nylon suture was then used to close the incision.  Air was injected underneath the graft.  A Sinskey hook was used to center it.  A full air bubble was placed for 10 minutes.  After 10 minutes, this was exchanged for an 80% fill.  At the end of the procedure, the wounds were watertight.  The graft was well centered.  There was an 80% fill with no evidence of air behind the pupil.  The inferior peripheral iridotomy appeared to be patent.  A bandage contact was placed.  Injections of Kenalog and vancomycin were administered.  This was also  followed with drops of Betadine, CatarActive3, Timolol and atropine.  Maxitrol ointment was then used.  The eye was then covered with a sterile eye patch and Leonard shield.  The patient was left in the face up position for 1 hour.  The patient tolerated the procedure well.  There were no complications.         BAR SOSA MD             D: 10/01/2018   T: 10/01/2018   MT: ROSITA      Name:     OFELIA BANKS   MRN:      [id number]        Account:        [id number]   :      1950           Procedure Date: 10/01/2018      Document: [id number]       (2) good, crying

## (undated) DEVICE — LINEN TOWEL PACK X5 5464

## (undated) DEVICE — EYE NDL RETROBULBAR 25GA 1.5" 581275

## (undated) DEVICE — EYE MARKING PAD 581057

## (undated) DEVICE — EYE RING MALYUGIN PUPIL EXPANDER 6.25MM MAL-000-1

## (undated) DEVICE — NDL 22GA 1.5"

## (undated) DEVICE — GLOVE PROTEXIS MICRO 7.5  2D73PM75

## (undated) DEVICE — EYE SOL BSS 500ML

## (undated) DEVICE — EYE CANN IRR 20GA ACM LEWICKY 585061

## (undated) DEVICE — GLOVE PROTEXIS MICRO 7.0  2D73PM70

## (undated) DEVICE — PACK CATARACT CUSTOM SO DALE SEY32CTFCX

## (undated) DEVICE — EYE PUNCH VACUUM BARRON 8.0MM K20-2108

## (undated) DEVICE — SYR 10ML SLIP TIP W/O NDL

## (undated) DEVICE — SU ETHILON 10-0 CS160-6 12" 9000G

## (undated) DEVICE — EYE PACK CUSTOM ANTERIOR 30DEG TIP CENTURION PPK6682-04

## (undated) DEVICE — EYE KNIFE SLIT 3.0MM OASIS PE4830-TL

## (undated) DEVICE — EYE SPONGE SPEAR WECK CEL 0008685

## (undated) DEVICE — EYE CANN IRR 30GA  ANTERIOR CHAMBER 581273

## (undated) DEVICE — EYE ROLLER LASIK FLAP 14MM SLV 585233

## (undated) DEVICE — ENDOSERTER DELIVERY SYSTEM ES1

## (undated) DEVICE — EYE SOL BSS 15ML BOTTLE 65079515

## (undated) DEVICE — EYE KNIFE STAB 15DEG PE3015

## (undated) RX ORDER — FENTANYL CITRATE 50 UG/ML
INJECTION, SOLUTION INTRAMUSCULAR; INTRAVENOUS
Status: DISPENSED
Start: 2019-01-08

## (undated) RX ORDER — ONDANSETRON 2 MG/ML
INJECTION INTRAMUSCULAR; INTRAVENOUS
Status: DISPENSED
Start: 2018-10-01

## (undated) RX ORDER — LIDOCAINE HYDROCHLORIDE 10 MG/ML
INJECTION, SOLUTION EPIDURAL; INFILTRATION; INTRACAUDAL; PERINEURAL
Status: DISPENSED
Start: 2018-10-01

## (undated) RX ORDER — WATER 10 ML/10ML
INJECTION INTRAMUSCULAR; INTRAVENOUS; SUBCUTANEOUS
Status: DISPENSED
Start: 2018-10-01

## (undated) RX ORDER — TRIAMCINOLONE ACETONIDE 40 MG/ML
INJECTION, SUSPENSION INTRA-ARTICULAR; INTRAMUSCULAR
Status: DISPENSED
Start: 2018-10-01

## (undated) RX ORDER — BALANCED SALT SOLUTION 6.4; .75; .48; .3; 3.9; 1.7 MG/ML; MG/ML; MG/ML; MG/ML; MG/ML; MG/ML
SOLUTION OPHTHALMIC
Status: DISPENSED
Start: 2018-10-01

## (undated) RX ORDER — EPINEPHRINE 1 MG/ML
INJECTION, SOLUTION, CONCENTRATE INTRAVENOUS
Status: DISPENSED
Start: 2018-10-01

## (undated) RX ORDER — ATROPINE SULFATE 10 MG/ML
SOLUTION/ DROPS OPHTHALMIC
Status: DISPENSED
Start: 2018-10-01

## (undated) RX ORDER — BUPIVACAINE HYDROCHLORIDE 7.5 MG/ML
INJECTION, SOLUTION EPIDURAL; RETROBULBAR
Status: DISPENSED
Start: 2018-10-01

## (undated) RX ORDER — LIDOCAINE HYDROCHLORIDE 20 MG/ML
INJECTION, SOLUTION EPIDURAL; INFILTRATION; INTRACAUDAL; PERINEURAL
Status: DISPENSED
Start: 2018-10-01

## (undated) RX ORDER — ONDANSETRON 2 MG/ML
INJECTION INTRAMUSCULAR; INTRAVENOUS
Status: DISPENSED
Start: 2019-01-08

## (undated) RX ORDER — LIDOCAINE HYDROCHLORIDE 20 MG/ML
INJECTION, SOLUTION INFILTRATION; PERINEURAL
Status: DISPENSED
Start: 2018-10-01

## (undated) RX ORDER — VANCOMYCIN HYDROCHLORIDE 1 G/20ML
INJECTION, POWDER, LYOPHILIZED, FOR SOLUTION INTRAVENOUS
Status: DISPENSED
Start: 2019-01-08

## (undated) RX ORDER — FENTANYL CITRATE 50 UG/ML
INJECTION, SOLUTION INTRAMUSCULAR; INTRAVENOUS
Status: DISPENSED
Start: 2018-10-01

## (undated) RX ORDER — PROPOFOL 10 MG/ML
INJECTION, EMULSION INTRAVENOUS
Status: DISPENSED
Start: 2019-01-08

## (undated) RX ORDER — CEFTAZIDIME 1 G/1
INJECTION, POWDER, FOR SOLUTION INTRAMUSCULAR; INTRAVENOUS
Status: DISPENSED
Start: 2018-10-01

## (undated) RX ORDER — WATER 10 ML/10ML
INJECTION INTRAMUSCULAR; INTRAVENOUS; SUBCUTANEOUS
Status: DISPENSED
Start: 2019-01-08

## (undated) RX ORDER — TIMOLOL 5 MG/ML
SOLUTION/ DROPS OPHTHALMIC
Status: DISPENSED
Start: 2018-10-01